# Patient Record
Sex: FEMALE | Race: WHITE | NOT HISPANIC OR LATINO | Employment: UNEMPLOYED | URBAN - METROPOLITAN AREA
[De-identification: names, ages, dates, MRNs, and addresses within clinical notes are randomized per-mention and may not be internally consistent; named-entity substitution may affect disease eponyms.]

---

## 2017-12-14 ENCOUNTER — TRANSFERRED RECORDS (OUTPATIENT)
Dept: HEALTH INFORMATION MANAGEMENT | Facility: CLINIC | Age: 37
End: 2017-12-14

## 2018-04-08 ENCOUNTER — HOSPITAL ENCOUNTER (EMERGENCY)
Facility: CLINIC | Age: 38
Discharge: HOME | End: 2018-04-08
Attending: EMERGENCY MEDICINE | Admitting: EMERGENCY MEDICINE
Payer: COMMERCIAL

## 2018-04-08 ENCOUNTER — APPOINTMENT (OUTPATIENT)
Dept: CT IMAGING | Facility: CLINIC | Age: 38
End: 2018-04-08
Attending: EMERGENCY MEDICINE
Payer: COMMERCIAL

## 2018-04-08 VITALS
HEART RATE: 86 BPM | OXYGEN SATURATION: 95 % | BODY MASS INDEX: 24.11 KG/M2 | SYSTOLIC BLOOD PRESSURE: 118 MMHG | HEIGHT: 66 IN | RESPIRATION RATE: 16 BRPM | TEMPERATURE: 98.1 F | WEIGHT: 150 LBS | DIASTOLIC BLOOD PRESSURE: 76 MMHG

## 2018-04-08 DIAGNOSIS — E86.0 DEHYDRATION: ICD-10-CM

## 2018-04-08 DIAGNOSIS — K12.0 ORAL APHTHOUS ULCER: ICD-10-CM

## 2018-04-08 DIAGNOSIS — N17.9 ACUTE KIDNEY INJURY (H): ICD-10-CM

## 2018-04-08 LAB
ALBUMIN SERPL-MCNC: 2.7 G/DL (ref 3.4–5)
ALP SERPL-CCNC: 109 U/L (ref 40–150)
ALT SERPL W P-5'-P-CCNC: 37 U/L (ref 0–50)
ANION GAP SERPL CALCULATED.3IONS-SCNC: 11 MMOL/L (ref 3–14)
ANION GAP SERPL CALCULATED.3IONS-SCNC: 9 MMOL/L (ref 3–14)
AST SERPL W P-5'-P-CCNC: 112 U/L (ref 0–45)
BASOPHILS # BLD AUTO: 0.1 10E9/L (ref 0–0.2)
BASOPHILS NFR BLD AUTO: 0.6 %
BILIRUB SERPL-MCNC: 0.4 MG/DL (ref 0.2–1.3)
BUN SERPL-MCNC: 21 MG/DL (ref 7–30)
BUN SERPL-MCNC: 23 MG/DL (ref 7–30)
CALCIUM SERPL-MCNC: 7.7 MG/DL (ref 8.5–10.1)
CALCIUM SERPL-MCNC: 8.2 MG/DL (ref 8.5–10.1)
CHLORIDE SERPL-SCNC: 110 MMOL/L (ref 94–109)
CHLORIDE SERPL-SCNC: 116 MMOL/L (ref 94–109)
CO2 SERPL-SCNC: 23 MMOL/L (ref 20–32)
CO2 SERPL-SCNC: 25 MMOL/L (ref 20–32)
CREAT SERPL-MCNC: 1.83 MG/DL (ref 0.52–1.04)
CREAT SERPL-MCNC: 2.16 MG/DL (ref 0.52–1.04)
DIFFERENTIAL METHOD BLD: ABNORMAL
EOSINOPHIL # BLD AUTO: 0.3 10E9/L (ref 0–0.7)
EOSINOPHIL NFR BLD AUTO: 2.4 %
ERYTHROCYTE [DISTWIDTH] IN BLOOD BY AUTOMATED COUNT: 19.8 % (ref 10–15)
GFR SERPL CREATININE-BSD FRML MDRD: 26 ML/MIN/1.7M2
GFR SERPL CREATININE-BSD FRML MDRD: 31 ML/MIN/1.7M2
GLUCOSE SERPL-MCNC: 54 MG/DL (ref 70–99)
GLUCOSE SERPL-MCNC: 57 MG/DL (ref 70–99)
HCT VFR BLD AUTO: 28.5 % (ref 35–47)
HGB BLD-MCNC: 9.1 G/DL (ref 11.7–15.7)
IMM GRANULOCYTES # BLD: 0 10E9/L (ref 0–0.4)
IMM GRANULOCYTES NFR BLD: 0.2 %
LIPASE SERPL-CCNC: 104 U/L (ref 73–393)
LYMPHOCYTES # BLD AUTO: 4.1 10E9/L (ref 0.8–5.3)
LYMPHOCYTES NFR BLD AUTO: 33.9 %
MCH RBC QN AUTO: 25.3 PG (ref 26.5–33)
MCHC RBC AUTO-ENTMCNC: 31.9 G/DL (ref 31.5–36.5)
MCV RBC AUTO: 79 FL (ref 78–100)
MONOCYTES # BLD AUTO: 1.1 10E9/L (ref 0–1.3)
MONOCYTES NFR BLD AUTO: 9 %
NEUTROPHILS # BLD AUTO: 6.5 10E9/L (ref 1.6–8.3)
NEUTROPHILS NFR BLD AUTO: 53.9 %
NRBC # BLD AUTO: 0 10*3/UL
NRBC BLD AUTO-RTO: 0 /100
PLATELET # BLD AUTO: 429 10E9/L (ref 150–450)
POTASSIUM SERPL-SCNC: 3.2 MMOL/L (ref 3.4–5.3)
POTASSIUM SERPL-SCNC: 4 MMOL/L (ref 3.4–5.3)
PROT SERPL-MCNC: 6.7 G/DL (ref 6.8–8.8)
RBC # BLD AUTO: 3.6 10E12/L (ref 3.8–5.2)
SODIUM SERPL-SCNC: 146 MMOL/L (ref 133–144)
SODIUM SERPL-SCNC: 149 MMOL/L (ref 133–144)
WBC # BLD AUTO: 12.1 10E9/L (ref 4–11)

## 2018-04-08 PROCEDURE — 96374 THER/PROPH/DIAG INJ IV PUSH: CPT | Performed by: EMERGENCY MEDICINE

## 2018-04-08 PROCEDURE — 25000132 ZZH RX MED GY IP 250 OP 250 PS 637: Mod: GY | Performed by: EMERGENCY MEDICINE

## 2018-04-08 PROCEDURE — 99284 EMERGENCY DEPT VISIT MOD MDM: CPT | Mod: 25 | Performed by: EMERGENCY MEDICINE

## 2018-04-08 PROCEDURE — 85025 COMPLETE CBC W/AUTO DIFF WBC: CPT | Performed by: EMERGENCY MEDICINE

## 2018-04-08 PROCEDURE — 25000128 H RX IP 250 OP 636: Performed by: EMERGENCY MEDICINE

## 2018-04-08 PROCEDURE — 25800025 ZZH RX 258: Performed by: EMERGENCY MEDICINE

## 2018-04-08 PROCEDURE — 80053 COMPREHEN METABOLIC PANEL: CPT | Performed by: EMERGENCY MEDICINE

## 2018-04-08 PROCEDURE — 83690 ASSAY OF LIPASE: CPT | Performed by: EMERGENCY MEDICINE

## 2018-04-08 PROCEDURE — 40000556 ZZH STATISTIC PERIPHERAL IV START W US GUIDANCE

## 2018-04-08 PROCEDURE — 74176 CT ABD & PELVIS W/O CONTRAST: CPT

## 2018-04-08 PROCEDURE — 99284 EMERGENCY DEPT VISIT MOD MDM: CPT | Mod: Z6 | Performed by: EMERGENCY MEDICINE

## 2018-04-08 PROCEDURE — 96361 HYDRATE IV INFUSION ADD-ON: CPT | Performed by: EMERGENCY MEDICINE

## 2018-04-08 PROCEDURE — 80048 BASIC METABOLIC PNL TOTAL CA: CPT | Performed by: EMERGENCY MEDICINE

## 2018-04-08 PROCEDURE — A9270 NON-COVERED ITEM OR SERVICE: HCPCS | Mod: GY | Performed by: EMERGENCY MEDICINE

## 2018-04-08 RX ORDER — DEXTROSE MONOHYDRATE 25 G/50ML
25 INJECTION, SOLUTION INTRAVENOUS ONCE
Status: COMPLETED | OUTPATIENT
Start: 2018-04-08 | End: 2018-04-08

## 2018-04-08 RX ORDER — SODIUM CHLORIDE 9 MG/ML
1000 INJECTION, SOLUTION INTRAVENOUS CONTINUOUS
Status: DISCONTINUED | OUTPATIENT
Start: 2018-04-08 | End: 2018-04-08 | Stop reason: HOSPADM

## 2018-04-08 RX ORDER — DIPHENOXYLATE HCL/ATROPINE 2.5-.025MG
2 TABLET ORAL 4 TIMES DAILY
COMMUNITY

## 2018-04-08 RX ORDER — DIPHENHYDRAMINE HYDROCHLORIDE AND LIDOCAINE HYDROCHLORIDE AND ALUMINUM HYDROXIDE AND MAGNESIUM HYDRO
15 KIT EVERY 6 HOURS PRN
Status: DISCONTINUED | OUTPATIENT
Start: 2018-04-08 | End: 2018-04-08 | Stop reason: HOSPADM

## 2018-04-08 RX ORDER — POTASSIUM CHLORIDE 20MEQ/15ML
40 LIQUID (ML) ORAL ONCE
Status: COMPLETED | OUTPATIENT
Start: 2018-04-08 | End: 2018-04-08

## 2018-04-08 RX ADMIN — SODIUM CHLORIDE 1000 ML: 9 INJECTION, SOLUTION INTRAVENOUS at 05:37

## 2018-04-08 RX ADMIN — SODIUM CHLORIDE 1000 ML: 9 INJECTION, SOLUTION INTRAVENOUS at 03:30

## 2018-04-08 RX ADMIN — DIPHENHYDRAMINE HYDROCHLORIDE AND LIDOCAINE HYDROCHLORIDE AND ALUMINUM HYDROXIDE AND MAGNESIUM HYDRO 15 ML: KIT at 06:39

## 2018-04-08 RX ADMIN — DEXTROSE MONOHYDRATE 25 ML: 25 INJECTION, SOLUTION INTRAVENOUS at 07:29

## 2018-04-08 RX ADMIN — POTASSIUM CHLORIDE 40 MEQ: 20 SOLUTION ORAL at 04:26

## 2018-04-08 ASSESSMENT — ENCOUNTER SYMPTOMS
ABDOMINAL PAIN: 1
APPETITE CHANGE: 1
FEVER: 0
VOMITING: 1
NAUSEA: 1
ABDOMINAL DISTENTION: 0
RESPIRATORY NEGATIVE: 1
CARDIOVASCULAR NEGATIVE: 1
FATIGUE: 1
CONSTIPATION: 1

## 2018-04-08 NOTE — DISCHARGE INSTRUCTIONS
Follow-up with your doctors at [facility]  Your CT scan is not showing an acute problem  You were mildly dehydrated but this was corrected with IV fluids  Use the Magic mouthwash for the oral ulcers

## 2018-04-08 NOTE — ED AVS SNAPSHOT
[facility], Emergency Department    [address] [address]    Phone:  [phone]                                       Iris Cote   MRN: [id number]    Department:  [facility], Emergency Department   Date of Visit:  4/8/2018           Patient Information     Date Of Birth          1980        Your diagnoses for this visit were:     Acute kidney injury (H)     Dehydration     Oral aphthous ulcer        You were seen by Lon Longoria MD.        Discharge Instructions       Follow-up with your doctors at [facility]  Your CT scan is not showing an acute problem  You were mildly dehydrated but this was corrected with IV fluids  Use the Magic mouthwash for the oral ulcers    24 Hour Appointment Hotline       To make an appointment at any [facility], call [phone] ([phone]). If you don't have a family doctor or clinic, we will help you find one. [facility] clinics are conveniently located to serve the needs of you and your family.             Review of your medicines      Our records show that you are taking the medicines listed below. If these are incorrect, please call your family doctor or clinic.        Dose / Directions Last dose taken    aspirin 81 MG tablet        Take by mouth daily   Refills:  0        CLARITIN PO        Refills:  0        diphenoxylate-atropine 2.5-0.025 MG per tablet   Commonly known as:  LOMOTIL   Dose:  2 tablet        Take 2 tablets by mouth 4 times daily   Refills:  0        doxepin 50 MG capsule   Commonly known as:  SINEquan   Dose:  50 mg        Take 50 mg by mouth At Bedtime   Refills:  0        FIBER-LAX PO        Take by mouth 3 times daily   Refills:  0        hydrOXYzine 50 MG tablet   Commonly known as:  ATARAX   Dose:  50 mg   Quantity:  120 tablet        Take 1 tablet (50 mg) by mouth every 6 hours as needed (pruitus, anxiety)   Refills:  0        ibuprofen 400 MG tablet   Commonly known as:   ADVIL/MOTRIN   Dose:  400 mg   Quantity:  120 tablet        Take 1 tablet (400 mg) by mouth every 6 hours as needed   Refills:  0        LEXAPRO PO   Dose:  20 mg        Take 20 mg by mouth   Refills:  0        METHADONE HCL PO   Dose:  180 mg        Take 180 mg by mouth daily   Refills:  0        MINIPRESS PO        Take by mouth At Bedtime   Refills:  0        NEURONTIN PO   Dose:  1200 mg        Take 1,200 mg by mouth 3 times daily   Refills:  0        OMEPRAZOLE PO   Dose:  20 mg        Take 20 mg by mouth 2 times daily (before meals)   Refills:  0        PROMETHAZINE HCL RE        Refills:  0        * PROVENTIL  (90 BASE) MCG/ACT Inhaler   Dose:  2 puff   Quantity:  1 Inhaler   Generic drug:  albuterol        Inhale 2 puffs into the lungs every 6 hours.   Refills:  0        * albuterol (2.5 MG/3ML) 0.083% neb solution   Dose:  1 ampule   Quantity:  1 Box        Take 3 mLs by nebulization every 6 hours as needed for shortness of breath / dyspnea.   Refills:  0        * Notice:  This list has 2 medication(s) that are the same as other medications prescribed for you. Read the directions carefully, and ask your doctor or other care provider to review them with you.            Procedures and tests performed during your visit     Basic metabolic panel    CBC with platelets differential    CT Abdomen Pelvis w/o Contrast    Comprehensive metabolic panel    Give 20 ounces of water 15 minutes before CT of abdomen    Lipase    V[facility] Care Adult IP Consult      Orders Needing Specimen Collection     Ordered          04/08/18 0218  UA with Microscopic - STAT, Prio: STAT, Needs to be Collected     Scheduled Task Status   04/08/18 0219 Collect UA with Microscopic Open   Order Class:  PCU Collect                04/08/18 0218  Drug abuse screen 6 urine (chem dep) - STAT, Prio: STAT, Needs to be Collected     Scheduled Task Status   04/08/18 0219 Collect Drug abuse screen 6 urine (chem dep) Open   Order Class:   "PCU Collect                  Pending Results     Date and Time Order Name Status Description    2018 0416 CT Abdomen Pelvis w/o Contrast Preliminary             Pending Culture Results     No orders found from 2018 to 2018.            Pending Results Instructions     If you had any lab results that were not finalized at the time of your Discharge, you can call the ED Lab Result RN at [phone]. You will be contacted by this team for any positive Lab results or changes in treatment. The nurses are available 7 days a week from 10A to 6:30P.  You can leave a message 24 hours per day and they will return your call.        Thank you for choosing [facility]       Thank you for choosing [facility] for your care. Our goal is always to provide you with excellent care. Hearing back from our patients is one way we can continue to improve our services. Please take a few minutes to complete the written survey that you may receive in the mail after you visit with us. Thank you!        [employer] Information     [employer] lets you send messages to your doctor, view your test results, renew your prescriptions, schedule appointments and more. To sign up, go to [email or web address] . Click on \"Log in\" on the left side of the screen, which will take you to the Welcome page. Then click on \"Sign up Now\" on the right side of the page.     You will be asked to enter the access code listed below, as well as some personal information. Please follow the directions to create your username and password.     Your access code is: [email or web address]  Expires: 2018  7:38 AM     Your access code will  in 90 days. If you need help or a new code, please call your [facility] clinic or [phone].        Care EveryWhere ID     This is your Care EveryWhere ID. This could be used by other organizations to access your [facility] medical records  [id number]        Equal Access to Services     YASH ROSA: Juan Mullen, " theron zaman, lei carpio. So [facility] [phone].    ATENCIÓN: Si habla español, tiene a potter disposición servicios gratuitos de asistencia lingüística. Llame al [phone].    We comply with applicable federal civil rights laws and [place] laws. We do not discriminate on the basis of race, color, national origin, age, disability, sex, sexual orientation, or gender identity.            After Visit Summary       This is your record. Keep this with you and show to your community pharmacist(s) and doctor(s) at your next visit.

## 2018-04-08 NOTE — ED NOTES
Patient given 4 ounces of apple juice now, and crackers, for chronically low blood sugar; MD notified.

## 2018-04-08 NOTE — ED NOTES
Pt biba with c/o LUQ abdominal pain. Pt states that she has not been able to eat or drink much in the last 3 days due to some sore in her mouth. Also was nauseous and emesis in the last 3 days. No BM x3 days, pretty usual for pt. Also no urine output for 3 days due to decrease fluid intake. Pt also reports syncopal episode prior to calling ambulance. Denies hitting head. Abrasion to left knee. Hx of multiple abdominal surgeries in the couple years. VSS and afebrile.

## 2018-04-08 NOTE — ED PROVIDER NOTES
History     Chief Complaint   Patient presents with     Abdominal Pain     HPI  Iris Cote is a 38 year old female who normally gets her care at [facility].  She has had multiple abdominal surgeries currently has a wound that is healing she also suffers from chronic malnutrition.  She comes in with nausea vomiting and worsening abdominal pain.  She has not distended the chronic abdominal wound is unchanged in appearance.  There is no blood in the emesis.  She has had a cholecystectomy appendectomy and splenectomy as well as subtotal colectomy I believe for Clostridium difficile colitis.  She had a herniorrhaphy with mesh that subsequently was infected and removed.  She has not had fevers.  No urinary symptoms.  She is chronically on methadone for pain.  She occasionally uses heroin and cocaine.    Past Medical History:   Diagnosis Date     Anxiety      Depression      Seizures (H)        Social History     Social History     Marital status: Legally      Spouse name: N/A     Number of children: N/A     Years of education: N/A     Occupational History     Not on file.     Social History Main Topics     Smoking status: Current Every Day Smoker     Packs/day: 1.00     Smokeless tobacco: Not on file     Alcohol use Yes      Comment: rare     Drug use: No      Comment: IV heroin use 3 wks ago, snorts heroin, use of cocaine irreg     Sexual activity: Not on file     Other Topics Concern     Not on file     Social History Narrative         I have reviewed the Medications, Allergies, Past Medical and Surgical History, and Social History in the Epic system.    Review of Systems   Constitutional: Positive for appetite change and fatigue. Negative for fever.   Respiratory: Negative.    Cardiovascular: Negative.    Gastrointestinal: Positive for abdominal pain, constipation, nausea and vomiting. Negative for abdominal distention.   Genitourinary: Negative.    All other systems reviewed and are  "negative.      Physical Exam   BP: 123/75  Pulse: 86  Heart Rate: 75  Temp: 98.1  F (36.7  C)  Resp: 18  Height: 167.6 cm (5' 6\")  Weight: 68 kg (150 lb)  SpO2: 100 %      Physical Exam   Constitutional: She is oriented to person, place, and time. She appears well-developed and well-nourished. No distress.   HENT:   Mouth/Throat: Oral lesions present.   Multiple aphthous ulcers   Eyes: Pupils are equal, round, and reactive to light.   Neck: Neck supple.   Cardiovascular: Normal rate, regular rhythm and normal heart sounds.    Pulmonary/Chest: Effort normal and breath sounds normal.   Abdominal: Soft. She exhibits no distension. There is no tenderness.       Chronic lower abdominal wound examined, appears clean, no purulence no cellulitis, fresh dressing applied   Neurological: She is alert and oriented to person, place, and time.   Skin: Skin is warm. She is not diaphoretic.   Psychiatric: She has a normal mood and affect. Her behavior is normal.   Nursing note and vitals reviewed.      ED Course     ED Course     Procedures        Medications   0.9% sodium chloride BOLUS (0 mLs Intravenous Stopped 4/8/18 0632)     Followed by   sodium chloride 0.9% infusion (not administered)   iohexol (OMNIPAQUE) solution 50 mL (50 mLs Oral Not Given 4/8/18 0437)   magic mouthwash suspension (diphenhydramine, lidocaine, aluminum-magnesium & simethicone) (15 mLs Swish & Spit Given 4/8/18 0639)   0.9% sodium chloride BOLUS (0 mLs Intravenous Stopped 4/8/18 0642)   potassium chloride (KAYCIEL) solution 40 mEq (40 mEq Oral Given 4/8/18 0426)   dextrose 50 % injection 25 mL (25 mLs Intravenous Given 4/8/18 7829)     Orange juice and sandwich for hypoglycemia       Labs Ordered and Resulted from Time of ED Arrival Up to the Time of Departure from the ED   CBC WITH PLATELETS DIFFERENTIAL - Abnormal; Notable for the following:        Result Value    WBC 12.1 (*)     RBC Count 3.60 (*)     Hemoglobin 9.1 (*)     Hematocrit 28.5 (*)     MCH " 25.3 (*)     RDW 19.8 (*)     All other components within normal limits   COMPREHENSIVE METABOLIC PANEL - Abnormal; Notable for the following:     Sodium 146 (*)     Potassium 3.2 (*)     Chloride 110 (*)     Glucose 57 (*)     Creatinine 2.16 (*)     GFR Estimate 26 (*)     GFR Estimate If Black 31 (*)     Calcium 8.2 (*)     Albumin 2.7 (*)     Protein Total 6.7 (*)      (*)     All other components within normal limits   BASIC METABOLIC PANEL - Abnormal; Notable for the following:     Sodium 149 (*)     Chloride 116 (*)     Glucose 54 (*)     Creatinine 1.83 (*)     GFR Estimate 31 (*)     GFR Estimate If Black 38 (*)     Calcium 7.7 (*)     All other components within normal limits   LIPASE   ROUTINE UA WITH MICROSCOPIC   DRUG ABUSE SCREEN 6 CHEM DEP URINE ([facility])   FREE WATER            Assessments & Plan (with Medical Decision Making)   38-year-old female normally gets her care at [facility] comes in with for the last few days she has a chronic healing abdominal wound after multiple abdominal surgeries at [facility].  Here she was mildly dehydrated her baseline creatinine is 1.8 she was also mildly hypokalemic these were both corrected with oral potassium and IV fluids.  We did a CT of her abdomen that shows some mildly borderline loops of bowel she may have enteritis but no definitive bowel obstruction.  She did not have vomiting here no fevers.  She will be discharged home and encouraged to follow-up at [facility]    I have reviewed the nursing notes.    I have reviewed the findings, diagnosis, plan and need for follow up with the patient.    New Prescriptions    No medications on file       Final diagnoses:   Acute kidney injury (H)   Dehydration   Oral aphthous ulcer       4/8/2018   [facility], [place], EMERGENCY DEPARTMENT     Lon Longoria MD  04/08/18 0725       Lon Longoria MD  04/08/18 0731

## 2018-04-08 NOTE — ED AVS SNAPSHOT
[facility], [place], Emergency Department    [address] [place] [address]    Phone:  [phone]                                       Iris Cote   MRN: [id number]    Department:  [facility], Emergency Department   Date of Visit:  4/8/2018           After Visit Summary Signature Page     I have received my discharge instructions, and my questions have been answered. I have discussed any challenges I see with this plan with the nurse or doctor.    ..........................................................................................................................................  Patient/Patient Representative Signature      ..........................................................................................................................................  Patient Representative Print Name and Relationship to Patient    ..................................................               ................................................  Date                                            Time    ..........................................................................................................................................  Reviewed by Signature/Title    ...................................................              ..............................................  Date                                                            Time

## 2020-08-24 ENCOUNTER — TRANSFERRED RECORDS (OUTPATIENT)
Dept: HEALTH INFORMATION MANAGEMENT | Facility: CLINIC | Age: 40
End: 2020-08-24

## 2020-08-24 LAB — EJECTION FRACTION: 58 %

## 2020-09-25 ENCOUNTER — TRANSFERRED RECORDS (OUTPATIENT)
Dept: HEALTH INFORMATION MANAGEMENT | Facility: CLINIC | Age: 40
End: 2020-09-25

## 2020-09-27 LAB — EJECTION FRACTION: 70 %

## 2020-10-08 ENCOUNTER — TRANSFERRED RECORDS (OUTPATIENT)
Dept: HEALTH INFORMATION MANAGEMENT | Facility: CLINIC | Age: 40
End: 2020-10-08

## 2020-12-15 ENCOUNTER — TRANSFERRED RECORDS (OUTPATIENT)
Dept: HEALTH INFORMATION MANAGEMENT | Facility: CLINIC | Age: 40
End: 2020-12-15

## 2020-12-16 ENCOUNTER — TRANSFERRED RECORDS (OUTPATIENT)
Dept: HEALTH INFORMATION MANAGEMENT | Facility: CLINIC | Age: 40
End: 2020-12-16

## 2020-12-17 ENCOUNTER — TRANSFERRED RECORDS (OUTPATIENT)
Dept: HEALTH INFORMATION MANAGEMENT | Facility: CLINIC | Age: 40
End: 2020-12-17

## 2021-03-05 ENCOUNTER — TRANSFERRED RECORDS (OUTPATIENT)
Dept: HEALTH INFORMATION MANAGEMENT | Facility: CLINIC | Age: 41
End: 2021-03-05

## 2021-03-05 ENCOUNTER — MEDICAL CORRESPONDENCE (OUTPATIENT)
Dept: HEALTH INFORMATION MANAGEMENT | Facility: CLINIC | Age: 41
End: 2021-03-05

## 2021-03-08 ENCOUNTER — TRANSCRIBE ORDERS (OUTPATIENT)
Dept: OTHER | Age: 41
End: 2021-03-08

## 2021-03-08 DIAGNOSIS — B19.20 DECOMPENSATED HCV CIRRHOSIS (H): Primary | ICD-10-CM

## 2021-03-08 DIAGNOSIS — K74.69 DECOMPENSATED HCV CIRRHOSIS (H): Primary | ICD-10-CM

## 2021-03-10 ENCOUNTER — REFERRAL (OUTPATIENT)
Dept: TRANSPLANT | Facility: CLINIC | Age: 41
End: 2021-03-10

## 2021-03-10 DIAGNOSIS — B19.20 HCV (HEPATITIS C VIRUS): Primary | ICD-10-CM

## 2021-03-10 NOTE — LETTER
3/18/2021    Iris Cote  [address]  [address]  [place] [address]      Dear Iris,    Thank you for your interest in the Transplant Center at [facility]. We look forward to being a part of your care team and assisting you through the transplant process.    As we discussed, your transplant coordinator is Charlene Coreas (Liver).  You may call your coordinator at any time with questions or concerns call [phone].    Please complete the following.    1. Fill out and return the enclosed forms    Authorization for Electronic Communication    Authorization to Discuss Protected Health Information    Authorization for Release of Protected Health Information    Authorization for Care Everywhere Release of Information    2. Sign up for:    [employer], access to your electronic medical record (see enclosed pamphlet)    [email or web address], a transplant education website    You can use these tools to learn more about your transplant, communicate with your care team, and track your medical details      Sincerely,  Solid Organ Transplant  [facility]    cc: Referring Physician and PCP

## 2021-03-15 ENCOUNTER — VIRTUAL VISIT (OUTPATIENT)
Dept: GASTROENTEROLOGY | Facility: CLINIC | Age: 41
End: 2021-03-15
Attending: INTERNAL MEDICINE
Payer: COMMERCIAL

## 2021-03-15 DIAGNOSIS — K74.69 OTHER CIRRHOSIS OF LIVER (H): Primary | ICD-10-CM

## 2021-03-15 DIAGNOSIS — N18.30 STAGE 3 CHRONIC KIDNEY DISEASE, UNSPECIFIED WHETHER STAGE 3A OR 3B CKD (H): ICD-10-CM

## 2021-03-15 DIAGNOSIS — Z12.9 SCREENING FOR CANCER: ICD-10-CM

## 2021-03-15 DIAGNOSIS — K74.69 DECOMPENSATED HCV CIRRHOSIS (H): ICD-10-CM

## 2021-03-15 DIAGNOSIS — I85.00 ESOPHAGEAL VARICES WITHOUT BLEEDING, UNSPECIFIED ESOPHAGEAL VARICES TYPE (H): Primary | ICD-10-CM

## 2021-03-15 DIAGNOSIS — A04.71 RECURRENT COLITIS DUE TO CLOSTRIDIOIDES DIFFICILE: ICD-10-CM

## 2021-03-15 DIAGNOSIS — R18.8 OTHER ASCITES: ICD-10-CM

## 2021-03-15 DIAGNOSIS — B19.20 DECOMPENSATED HCV CIRRHOSIS (H): ICD-10-CM

## 2021-03-15 DIAGNOSIS — Z01.818 ENCOUNTER FOR PRE-TRANSPLANT EVALUATION FOR CHRONIC LIVER DISEASE: ICD-10-CM

## 2021-03-15 PROCEDURE — 99204 OFFICE O/P NEW MOD 45 MIN: CPT | Mod: GT | Performed by: INTERNAL MEDICINE

## 2021-03-15 RX ORDER — CLONIDINE HYDROCHLORIDE 0.1 MG/1
0.1 TABLET ORAL 2 TIMES DAILY
COMMUNITY
Start: 2021-03-11

## 2021-03-15 RX ORDER — BUPRENORPHINE HYDROCHLORIDE AND NALOXONE HYDROCHLORIDE DIHYDRATE 8; 2 MG/1; MG/1
1 TABLET SUBLINGUAL
COMMUNITY

## 2021-03-15 RX ORDER — CARVEDILOL 12.5 MG/1
12.5 TABLET ORAL
COMMUNITY
Start: 2020-10-14

## 2021-03-15 ASSESSMENT — PAIN SCALES - GENERAL: PAINLEVEL: NO PAIN (0)

## 2021-03-15 NOTE — PROGRESS NOTES
Iris is a 40 year old who is being evaluated via a billable video visit.      How would you like to obtain your AVS? MyChart  If the video visit is dropped, the invitation should be resent by: Send to e-mail at: [email or web address]  Will anyone else be joining your video visit? No    Video Start Time: 0803  Video-Visit Details    Type of service:  Video Visit    Video End Time:0830    Originating Location (pt. 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    Diagnosis chronic hepatitis C in 2014.  Underwent treatment in 2017 with 12 weeks of Harvoni and obtained SVR.  Labs checked last in October 2020 with negative hep C viral level.  She had decompensated disease prior to her treatment with ascites, and has had less than a half dozen paracentesis in her lifetime.  Her last paracentesis was in December during her hospitalization, and she was started on Lasix and spironolactone at that time and has not had issues with further volume overload.  She has not had issues with impaired memory or concentration.  An EGD performed in December 2020 did not demonstrate any evidence of esophageal varices.  Abdominal imaging was performed in December 2020 and  did not demonstrate any evidence of hepatocellular carcinoma.    She had recurrent issues with C. difficile in the past and unfortunately developed toxic megacolon requiring an almost total colectomy in 2008.  She had a colostomy for approximately 1 year and then had a takedown.  She is also had abdominal surgeries including: An appendectomy in 2004, cholecystectomy, and a splenectomy for ITP back in 2002.  She had significant mesh use for abdominal hernias and this was revised in 2015/2016 -unfortunately she has had issues with a chronic nonhealing abdominal wound since this time.  She packs the wound and has a topical dressing that she uses and changes frequently.  She was followed by the wound clinic at [facility] for the several past years.    She notes she does carry a history of COPD and does have a historical diagnosis of heart failure.  The diagnosis of heart failure was given to her when she was living in [place], and notes at that time that she had developed intermittent V. tach which was felt related to her chronic methadone use.  Methadone was transitioned to Suboxone and she was started on carvedilol and she does not believe she has any issues with heart failure at this time.    Past Medical History:  Past Medical History:   Diagnosis Date     Acute idiopathic thrombocytopenic purpura (H)     s/p splenectomy 2002     Anxiety      Anxiety and depression      Ascites      CKD (chronic kidney disease) stage 3, GFR 30-59 ml/min      COPD (chronic obstructive pulmonary disease) (H)      Depression      Hepatic cirrhosis due to chronic hepatitis C infection (H)      History of cocaine abuse (H)      History of heart failure      History of hepatitis C Dx 2014    SVR after 12 weeks Alondra 2017     Nicotine dependence      Opioid dependence on maintenance agonist therapy, no symptoms (H)     Was on methadone (developed V Tach), now on suboxone     Recurrent colitis due to Clostridioides difficile     complicated  by megacolon s/p colectomy      Seizure disorder (H)      Seizures (H)        Surgical History:  Past Surgical History:   Procedure Laterality Date     APPENDECTOMY  2004     CHOLECYSTECTOMY  2013     COLOSTOMY      colon removal, colostomnyin [id number], reversed in      GI SURGERY  2002    splenectomy     HC EXCISION INFECTED MESH, ABDOMEN  2015     SPLENECTOMY  2002       Social History:  Social History     Tobacco Use     Smoking status: Former Smoker     Packs/day: 1.00     Quit date: 2020     Years since quittin.5     Smokeless tobacco: Never Used   Substance Use Topics     Alcohol use: Yes     Comment: rare     Drug use: No     Comment: History of heroin (IV) and cocaine (smoked); sober since 2019       Family History:  Family History   Problem Relation Age of Onset     Lung Cancer Maternal Grandfather      Liver Disease No family hx of        Medications:  Current Outpatient Medications   Medication     buprenorphine-naloxone (SUBOXONE) 8-2 MG SUBL sublingual tablet     carvedilol (COREG) 12.5 MG tablet     cloNIDine (CATAPRES) 0.1 MG tablet     diphenoxylate-atropine (LOMOTIL) 2.5-0.025 MG per tablet     Gabapentin (NEURONTIN PO)     sertraline (ZOLOFT) 50 MG tablet     albuterol (2.5 MG/3ML) 0.083% nebulizer solution     Albuterol Sulfate (PROVENTIL HFA) 108 (90 BASE) MCG/ACT AERS     aspirin 81 MG tablet     Calcium Polycarbophil (FIBER-LAX PO)     DoxePIN (SINEQUAN) 50 MG capsule     Escitalopram Oxalate (LEXAPRO PO)     hydrOXYzine (ATARAX) 50 MG tablet     ibuprofen (ADVIL,MOTRIN) 400 MG tablet     Loratadine (CLARITIN PO)     METHADONE HCL PO     OMEPRAZOLE PO     Prazosin HCl (MINIPRESS PO)     PROMETHAZINE HCL RE     No current facility-administered medications for this visit.        Review of Systems  A complete 10 point review of systems was asked and answered in the negative unless specifically commented upon in the HPI    Objective:         There were no vitals filed for this  visit.  There is no height or weight on file to calculate BMI.     Physical Exam  Constitutional: Well-developed, well-nourished, in no apparent distress.    HEENT: Normocephalic. no scleral icterus.   Neck/Lymph: Normal ROM  Respiratory:Normal respiratory excursion   GI:  Several surgical incisions on skin, large adhesive dressing over mid abdomen.     Skin:  No jaundice.   Peripheral Vascular: no lower extremity edema.   Musculoskeletal:  ROM intact, normal muscle bulk    Psychiatric: Normal mood and affect. Behavior is normal.  Neuro:  no asterixis, no tremor    Labs and Diagnostic tests:  Labs from March 5, 2021: Sodium 136, bicarb 28, creatinine 1.32, AST 42, ALT 13, total bilirubin 0.2, alkaline phosphatase 198, WBC 11.4, hemoglobin 8.5, MCV 78.5, platelets 767, INR 1.1, albumin 2.8, alpha-fetoprotein 3    Imaging:  Abd US 12/2021  Findings:    Liver: There is free fluid surrounding the liver. Nodular contour of the liver. No visualized hepatic lesion. The main portal vein is patent with antegrade flow.    Gallbladder:  Surgically absent. The common duct measures 1 cm, within normal limits status post cholecystectomy.    Pancreas: Unremarkable     Right kidney: Measures 11 x 4.2 x 4.8 cm. No hydronephrosis.  Left kidney: Measures 8.2 x 3.8 x 4.3 cm.  No hydronephrosis. 2.8 x 0.5 x 0.8 cm hypoechoic lesion arising from the kidney, may represent simple cyst with fluid, hemorrhagic, or proteinaceous contents.    Spleen: Surgically absent. Free fluid in the left upper quadrant.    Normal caliber aorta. Patent IVC      Procedures:  EGD: 12/2020  Impression:          - Moderate Schatzki ring. Patient with narrowed esophagus  suggestive of EoE biopsies not performed at this time, she  has no dysphagia so finding of unclear clinical  signifance. No varices seen or sites of potential bleeding  to explain her chronic anemia.  - Erythematous mucosa in the gastric fundus, gastric body  and antrum.  - Normal examined  duodenum.    Colonoscopy: 2020  Impression:          - The procedure was aborted.  - Stool in the rectum.  - No specimens collected.    Assessment and Plan:    Cirrhosis:    -Secondary to chronic hepatitis C infection  -Thorough lab evaluation has been completed and only other noted abnormality is a positive serum JACK with titer 1:320.  That said, I do not believe the pattern of her liver tests suggest she has autoimmune hepatitis  -She has decompensated disease with ascites, that now appears compensated on low-dose diuretics  -She is up-to-date on her screening procedures  -We will repeat M ELD labs in 6 months    Chronic hepatitis C  -Treated with SVR in 2017; HCV RNA - 2020    Liver Transplant Candidacy:   - We discussed need for transplantation, organ availability and prioritization process for liver transplantation in the [place].  We discussed the guiding principals of justice and equality that dictate transplant candidacy  - We discussed the MELD score, the variables that are followed, and how the MELD score impacts transplantation  - We discussed the evaluation process for candidacy  - We discussed donor types - including  donors (DBD, DCD) and living donors  -At this time I have significant concerns about her ability to be a safe transplant candidate.  My greatest concerns regarding her history of medical problems including but not limited to: COPD, history of heart failure, history of V. tach.  Also have significant concerns about history of intra-abdominal surgeries and this may create an environment that is not amenable to surgical intervention    -We will have the patient seen in consultation by our transplant surgeons prior to completing a full transplant evaluation    Hepatocellular Cancer Screening:   -Abdominal ultrasound performed 2020 without evidence of hepatic masses  - Recommend screening for HCC every 6 months with either abdominal ultrasound or by  alternating abdominal ultrasound with EITHER a triple/quad phase CT Liver with IV contrast OR a Quad phase MRI Liver with IV contrast.  AFP levels should be checked every 6 months at time of imaging screen.    Ascites:  -She does have a history of ascites that is required rare paracentesis, with last December 2020  -Is currently on a regimen of Lasix 20 mg daily and spironolactone 25 mg daily with excellent result  - Continue to follow a sodium restricted (<2g sodium diet)     Hepatic Encephalopathy:  -No acute issues at this time.  Educated as to the symptoms    Esophageal Varices:   -EGD performed in December 2020 without evidence of esophageal varices  -Noted that she is on carvedilol for unrelated reasons    Kidney Health:   -She does have chronic kidney disease stage III for unclear reasons    History of colectomy:  -Felt prudent to repeat colonoscopy.  Recommend a 2-day preparation with Gatorade/MiraLAX prep  -We will plan to order with next clinic visit if not performed locally    Nutrition:  As with most patients with chronic liver disease, there is a significant degree of protein malnutrition.  Dicussed need to change dietary habits to improve overall protein balance.  Discussed the importance of eating between 1.2-1.5g/kg/day lean protein like eggs, fish, chicken, nuts, and legumes, in addition to a diet rich in fresh fruits and vegetables.  Continue to follow a sodium restricted (<2g sodium diet) and discussed the need to minimize the intake of carbohydrates and sugars, to avoid obesity.   - Strongly encourage protein supplements 2-3 times daily (Boost, Ensure, [place] Instant Milk, etc.) to meet protein and caloric intake.  - Recommend a bedtime snack with protein and complex carbohydrate to minimize risk of muscle catabolism overnight    Routine Health Care in Patient with Chronic Liver Disease:  - We recommend screening for hepatitis A and B, please vaccinate if not immune  - All patients with liver  disease, particularly those with cholestatic liver disease, are at an increased risk for osteoporosis.  We strongly recommend screening for Vitamin D deficiency at least twice yearly with aggressive supplementation/replacement as indicated.    - We also recommend a screening DEXA scan to evaluate for osteoporosis.  If present, should treat with calcium, Vitamin D supplementation, and recommend consideration of bisphosphonate therapy.  Also recommend follow up DEXA scans to evaluate for improvement of bone density on therapy.  - All patients with liver disease should avoid the use of Non-steroidal Anti-Inflammatory (NSAID) medications as they can cause significant injury to the kidneys in this population    Follow Up:  3 months     Thank you very much for the opportunity to participate in the care of this patient.  If you have any further questions, please don't hesitate to contact our office.    Thomas M. Leventhal, M.D.   of Medicine  Advanced & Transplant Hepatology  [facility]     Approximately 45 minutes of non face-to-face time were spent in review of the patient's medical record to date.  This included review of previous: clinic visits, hospital records, lab results, imaging studies, and procedural documentation.  The findings from this review are summarized in the above note.

## 2021-03-15 NOTE — LETTER
3/15/2021         RE: Iris Cote  [address]  [facility] [address]        Dear Colleague,    Thank you for referring your patient, Iris Cote, to the [facility] [place]. Please see a copy of my visit note below.    Iris is a 40 year old who is being evaluated via a billable video visit.      How would you like to obtain your AVS? MyChart  If the video visit is dropped, the invitation should be resent by: Send to e-mail at: [email or web address]  Will anyone else be joining your video visit? No    Video Start Time: 0803  Video-Visit Details    Type of service:  Video Visit    Video End Time:0830    Originating Location (pt. 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    Diagnosis chronic hepatitis C in 2014.  Underwent treatment in 2017 with 12 weeks of Harvoni and obtained SVR.  Labs checked last in October 2020 with negative hep C viral level.  She had decompensated disease prior to her treatment with ascites, and has had less than a half dozen paracentesis in her lifetime.  Her last paracentesis was in December during her hospitalization, and she was started on Lasix and  spironolactone at that time and has not had issues with further volume overload.  She has not had issues with impaired memory or concentration.  An EGD performed in December 2020 did not demonstrate any evidence of esophageal varices.  Abdominal imaging was performed in December 2020 and did not demonstrate any evidence of hepatocellular carcinoma.    She had recurrent issues with C. difficile in the past and unfortunately developed toxic megacolon requiring an almost total colectomy in 2008.  She had a colostomy for approximately 1 year and then had a takedown.  She is also had abdominal surgeries including: An appendectomy in 2004, cholecystectomy, and a splenectomy for ITP back in 2002.  She had significant mesh use for abdominal hernias and this was revised in 2015/2016 -unfortunately she has had issues with a chronic nonhealing abdominal wound since this time.  She packs the wound and has a topical dressing that she uses and changes frequently.  She was followed by the wound clinic at [facility] for the several past years.    She notes she does carry a history of COPD and does have a historical diagnosis of heart failure.  The diagnosis of heart failure was given to her when she was living in [place], and notes at that time that she had developed intermittent V. tach which was felt related to her chronic methadone use.  Methadone was transitioned to Suboxone and she was started on carvedilol and she does not believe she has any issues with heart failure at this time.    Past Medical History:  Past Medical History:   Diagnosis Date     Acute idiopathic thrombocytopenic purpura (H)     s/p splenectomy 2002     Anxiety      Anxiety and depression      Ascites      CKD (chronic kidney disease) stage 3, GFR 30-59 ml/min      COPD (chronic obstructive pulmonary disease) (H)      Depression      Hepatic cirrhosis due to chronic hepatitis C infection (H)      History of cocaine abuse (H)      History of heart failure       History of hepatitis C Dx 2014    SVR after 12 weeks Alondra 2017     Nicotine dependence      Opioid dependence on maintenance agonist therapy, no symptoms (H)     Was on methadone (developed V Tach), now on suboxone     Recurrent colitis due to Clostridioides difficile     complicated by megacolon s/p colectomy      Seizure disorder (H)      Seizures (H)        Surgical History:  Past Surgical History:   Procedure Laterality Date     APPENDECTOMY  2004     CHOLECYSTECTOMY  2013     COLOSTOMY      colon removal, colostomnyin [id number], reversed in      GI SURGERY      splenectomy     HC EXCISION INFECTED MESH, ABDOMEN  2015     SPLENECTOMY  2002       Social History:  Social History     Tobacco Use     Smoking status: Former Smoker     Packs/day: 1.00     Quit date: 2020     Years since quittin.5     Smokeless tobacco: Never Used   Substance Use Topics     Alcohol use: Yes     Comment: rare     Drug use: No     Comment: History of heroin (IV) and cocaine (smoked); sober since        Family History:  Family History   Problem Relation Age of Onset     Lung Cancer Maternal Grandfather      Liver Disease No family hx of        Medications:  Current Outpatient Medications   Medication     buprenorphine-naloxone (SUBOXONE) 8-2 MG SUBL sublingual tablet     carvedilol (COREG) 12.5 MG tablet     cloNIDine (CATAPRES) 0.1 MG tablet     diphenoxylate-atropine (LOMOTIL) 2.5-0.025 MG per tablet     Gabapentin (NEURONTIN PO)     sertraline (ZOLOFT) 50 MG tablet     albuterol (2.5 MG/3ML) 0.083% nebulizer solution     Albuterol Sulfate (PROVENTIL HFA) 108 (90 BASE) MCG/ACT AERS     aspirin 81 MG tablet     Calcium Polycarbophil (FIBER-LAX PO)     DoxePIN (SINEQUAN) 50 MG capsule     Escitalopram Oxalate (LEXAPRO PO)     hydrOXYzine (ATARAX) 50 MG tablet     ibuprofen (ADVIL,MOTRIN) 400 MG tablet     Loratadine (CLARITIN PO)     METHADONE HCL PO     OMEPRAZOLE PO     Prazosin HCl (MINIPRESS PO)      PROMETHAZINE HCL RE     No current facility-administered medications for this visit.        Review of Systems  A complete 10 point review of systems was asked and answered in the negative unless specifically commented upon in the HPI    Objective:         There were no vitals filed for this visit.  There is no height or weight on file to calculate BMI.     Physical Exam  Constitutional: Well-developed, well-nourished, in no apparent distress.    HEENT: Normocephalic. no scleral icterus.   Neck/Lymph: Normal ROM  Respiratory:Normal respiratory excursion   GI:  Several surgical incisions on skin, large adhesive dressing over mid abdomen.     Skin:  No jaundice.   Peripheral Vascular: no lower extremity edema.   Musculoskeletal:  ROM intact, normal muscle bulk    Psychiatric: Normal mood and affect. Behavior is normal.  Neuro:  no asterixis, no tremor    Labs and Diagnostic tests:  Labs from March 5, 2021: Sodium 136, bicarb 28, creatinine 1.32, AST 42, ALT 13, total bilirubin 0.2, alkaline phosphatase 198, WBC 11.4, hemoglobin 8.5, MCV 78.5, platelets 767, INR 1.1, albumin 2.8, alpha-fetoprotein 3    Imaging:  Abd US 12/2021  Findings:    Liver: There is free fluid surrounding the liver. Nodular contour of the liver. No visualized hepatic lesion. The main portal vein is patent with antegrade flow.    Gallbladder:  Surgically absent. The common duct measures 1 cm, within normal limits status post cholecystectomy.    Pancreas: Unremarkable     Right kidney: Measures 11 x 4.2 x 4.8 cm. No hydronephrosis.  Left kidney: Measures 8.2 x 3.8 x 4.3 cm.  No hydronephrosis. 2.8 x 0.5 x 0.8 cm hypoechoic lesion arising from the kidney, may represent simple cyst with fluid, hemorrhagic, or proteinaceous contents.    Spleen: Surgically absent. Free fluid in the left upper quadrant.    Normal caliber aorta. Patent IVC      Procedures:  EGD: 12/2020  Impression:          - Moderate Schatzki ring. Patient with narrowed  esophagus  suggestive of EoE biopsies not performed at this time, she  has no dysphagia so finding of unclear clinical  signifance. No varices seen or sites of potential bleeding  to explain her chronic anemia.  - Erythematous mucosa in the gastric fundus, gastric body  and antrum.  - Normal examined duodenum.    Colonoscopy: 2020  Impression:          - The procedure was aborted.  - Stool in the rectum.  - No specimens collected.    Assessment and Plan:    Cirrhosis:    -Secondary to chronic hepatitis C infection  -Thorough lab evaluation has been completed and only other noted abnormality is a positive serum JACK with titer 1:320.  That said, I do not believe the pattern of her liver tests suggest she has autoimmune hepatitis  -She has decompensated disease with ascites, that now appears compensated on low-dose diuretics  -She is up-to-date on her screening procedures  -We will repeat M ELD labs in 6 months    Chronic hepatitis C  -Treated with SVR in 2017; HCV RNA - 2020    Liver Transplant Candidacy:   - We discussed need for transplantation, organ availability and prioritization process for liver transplantation in the [place].  We discussed the guiding principals of justice and equality that dictate transplant candidacy  - We discussed the MELD score, the variables that are followed, and how the MELD score impacts transplantation  - We discussed the evaluation process for candidacy  - We discussed donor types - including  donors (DBD, DCD) and living donors  -At this time I have significant concerns about her ability to be a safe transplant candidate.  My greatest concerns regarding her history of medical problems including but not limited to: COPD, history of heart failure, history of V. tach.  Also have significant concerns about history of intra-abdominal surgeries and this may create an environment that is not amenable to surgical intervention    -We will have the patient seen in  consultation by our transplant surgeons prior to completing a full transplant evaluation    Hepatocellular Cancer Screening:   -Abdominal ultrasound performed December 2020 without evidence of hepatic masses  - Recommend screening for HCC every 6 months with either abdominal ultrasound or by alternating abdominal ultrasound with EITHER a triple/quad phase CT Liver with IV contrast OR a Quad phase MRI Liver with IV contrast.  AFP levels should be checked every 6 months at time of imaging screen.    Ascites:  -She does have a history of ascites that is required rare paracentesis, with last December 2020  -Is currently on a regimen of Lasix 20 mg daily and spironolactone 25 mg daily with excellent result  - Continue to follow a sodium restricted (<2g sodium diet)     Hepatic Encephalopathy:  -No acute issues at this time.  Educated as to the symptoms    Esophageal Varices:   -EGD performed in December 2020 without evidence of esophageal varices  -Noted that she is on carvedilol for unrelated reasons    Kidney Health:   -She does have chronic kidney disease stage III for unclear reasons    History of colectomy:  -Felt prudent to repeat colonoscopy.  Recommend a 2-day preparation with Gatorade/MiraLAX prep  -We will plan to order with next clinic visit if not performed locally    Nutrition:  As with most patients with chronic liver disease, there is a significant degree of protein malnutrition.  Dicussed need to change dietary habits to improve overall protein balance.  Discussed the importance of eating between 1.2-1.5g/kg/day lean protein like eggs, fish, chicken, nuts, and legumes, in addition to a diet rich in fresh fruits and vegetables.  Continue to follow a sodium restricted (<2g sodium diet) and discussed the need to minimize the intake of carbohydrates and sugars, to avoid obesity.   - Strongly encourage protein supplements 2-3 times daily (Boost, Ensure, [place] Instant Milk, etc.) to meet protein and caloric  intake.  - Recommend a bedtime snack with protein and complex carbohydrate to minimize risk of muscle catabolism overnight    Routine Health Care in Patient with Chronic Liver Disease:  - We recommend screening for hepatitis A and B, please vaccinate if not immune  - All patients with liver disease, particularly those with cholestatic liver disease, are at an increased risk for osteoporosis.  We strongly recommend screening for Vitamin D deficiency at least twice yearly with aggressive supplementation/replacement as indicated.    - We also recommend a screening DEXA scan to evaluate for osteoporosis.  If present, should treat with calcium, Vitamin D supplementation, and recommend consideration of bisphosphonate therapy.  Also recommend follow up DEXA scans to evaluate for improvement of bone density on therapy.  - All patients with liver disease should avoid the use of Non-steroidal Anti-Inflammatory (NSAID) medications as they can cause significant injury to the kidneys in this population    Follow Up:  3 months     Thank you very much for the opportunity to participate in the care of this patient.  If you have any further questions, please don't hesitate to contact our office.    Thomas M. Leventhal, M.D.   of Medicine  Advanced & Transplant Hepatology  [facility]     Approximately 45 minutes of non face-to-face time were spent in review of the patient's medical record to date.  This included review of previous: clinic visits, hospital records, lab results, imaging studies, and procedural documentation.  The findings from this review are summarized in the above note.        Again, thank you for allowing me to participate in the care of your patient.        Sincerely,        Thomas M. Leventhal, MD

## 2021-03-18 ENCOUNTER — TELEPHONE (OUTPATIENT)
Dept: GASTROENTEROLOGY | Facility: CLINIC | Age: 41
End: 2021-03-18

## 2021-03-18 ENCOUNTER — DOCUMENTATION ONLY (OUTPATIENT)
Dept: TRANSPLANT | Facility: CLINIC | Age: 41
End: 2021-03-18

## 2021-03-18 VITALS — HEIGHT: 66 IN | WEIGHT: 168 LBS | BODY MASS INDEX: 27 KG/M2

## 2021-03-18 SDOH — HEALTH STABILITY: MENTAL HEALTH: HOW MANY STANDARD DRINKS CONTAINING ALCOHOL DO YOU HAVE ON A TYPICAL DAY?: NOT ASKED

## 2021-03-18 SDOH — HEALTH STABILITY: MENTAL HEALTH: HOW OFTEN DO YOU HAVE A DRINK CONTAINING ALCOHOL?: NOT ASKED

## 2021-03-18 SDOH — HEALTH STABILITY: MENTAL HEALTH: HOW OFTEN DO YOU HAVE 6 OR MORE DRINKS ON ONE OCCASION?: NOT ASKED

## 2021-03-18 ASSESSMENT — MIFFLIN-ST. JEOR: SCORE: 1458.79

## 2021-03-18 NOTE — TELEPHONE ENCOUNTER
Patient was asked the following questions during liver intake call.     Referring Provider: Jeferson GORE  Referring Diagnosis: HCV/Cirrhosis of the Liver   PCP: Dr. Chantel Young     1)Do you know why you have liver disease: Yes             If Alcoholic Cirrhosis is present when was your last drink: 2019             Have you ever been through treatment for alcohol:   2) Presence of Ascites: Yes Paracentesis: Yes  3) Presence of Hepatic Encephalopathy: No Medications: Yes  4) History of GI Bleeding: None  5) Oxygen Use: None  6) EGD: Yes Where: [facility] When: 2020  7) Colonoscopy: Yes Where: [facility] When:2020  8) MELD Score: 10  9) Insurance information: [employer]       Policy carver: Self       Subscriber/policy/ID number: [id number]      Group Number: [id number]    Referral intake process completed.  Patient is aware that after financial approval is received, medical records will be requested.   Patient confirmed for a callback from transplant coordinator on 3/22/21.  Tentative evaluation date TBD.    Confirmed coordinator will discuss evaluation process in more detail at the time of their call.   Patient is aware of the need to arrange age appropriate cancer screening, vaccinations, and dental care.  Reminded patient to complete questionnaire, complete medical records release, and review packet prior to evaluation visit .  Assessed patient for special needs (ie--wheelchair, assistance, guardian, and ):  None  Patient instructed to call [phone] with questions.     Patient gave verbal consent during intake call to obtain medical records and documents outside of [facility]:  Yes    MOHIT Chen, LPN   Solid Organ Transplant

## 2021-03-22 NOTE — TELEPHONE ENCOUNTER
Patient scheduled to receive a call from me this morning to begin txp referral process    Unable to reach- left a voice mail for patient to call my direct line [phone].

## 2021-03-25 NOTE — PROGRESS NOTES
Left second voice mail trying to reach patient to initiate referral call for txp eval;  scheduling has also tried to reach in order to set up txp surgery consult appt.

## 2021-04-07 NOTE — TELEPHONE ENCOUNTER
Spoke with the patient and confirmed Transplant Surgeon appointments on 4/22/21.  Informed patient an itinerary can be accessed on [employer].

## 2021-04-17 ENCOUNTER — HEALTH MAINTENANCE LETTER (OUTPATIENT)
Age: 41
End: 2021-04-17

## 2021-04-22 ENCOUNTER — DOCUMENTATION ONLY (OUTPATIENT)
Dept: TRANSPLANT | Facility: CLINIC | Age: 41
End: 2021-04-22

## 2021-04-22 ENCOUNTER — OFFICE VISIT (OUTPATIENT)
Dept: TRANSPLANT | Facility: CLINIC | Age: 41
End: 2021-04-22
Attending: INTERNAL MEDICINE
Payer: COMMERCIAL

## 2021-04-22 VITALS
BODY MASS INDEX: 26.26 KG/M2 | DIASTOLIC BLOOD PRESSURE: 84 MMHG | HEART RATE: 90 BPM | OXYGEN SATURATION: 96 % | WEIGHT: 162.7 LBS | SYSTOLIC BLOOD PRESSURE: 129 MMHG

## 2021-04-22 DIAGNOSIS — K74.69 OTHER CIRRHOSIS OF LIVER (H): ICD-10-CM

## 2021-04-22 PROCEDURE — 99204 OFFICE O/P NEW MOD 45 MIN: CPT | Performed by: TRANSPLANT SURGERY

## 2021-04-22 RX ORDER — ASCORBIC ACID 500 MG
500 TABLET ORAL
COMMUNITY
Start: 2021-02-23

## 2021-04-22 RX ORDER — FERROUS SULFATE 325(65) MG
325 TABLET ORAL
COMMUNITY
Start: 2020-11-05

## 2021-04-22 RX ORDER — MIRTAZAPINE 15 MG/1
15 TABLET, FILM COATED ORAL
COMMUNITY
Start: 2021-02-23

## 2021-04-22 NOTE — PROGRESS NOTES
Left another voice mail for patient.    Liver referral, seen by Dr. Leventhal, scheduled for consult only with surgery prior to consideration of full eval for liver txp    Have made several attempts to connect with patient for referral, no return calls.

## 2021-04-22 NOTE — PROGRESS NOTES
Attestation:  - HCV cirrhosis, s/p treatment with SVR  - MELD 10, main symptom - ascites  - Multiple abdominal surgeries (total abdominal colectomy for toxic megacolon, ileostomy, reversal, splenectomy for ITP, hernia repairs (currently no mesh per pt), but has chronic wounds above navel - no evidence of infection; although pt has multiple abd surgeries, this would not be contraindication for DD or LD tx  - Multiple auto-immune disorders: ITP, myoasthenia gravis, pt ended up in ICU on high-flow oxygen after muscle biopsy  - H/o heart failure  - chronic C-diff      Major concerns to be addressed in transplant discussion: h/o heart failure; myoasthenia gravis affecting her pulmonary function; opioid dependence    - if pt's symptoms can be controlled with medical management or if TIPS an option in the future for ascites control, I would be inclined to recommend these options.  Patient has been seen, evaluated and examined by me with resident/fellow/PA.   Vital signs, labs, medications and orders were reviewed.   When obtained, diagnostic images were reviewed by me and interpreted as above.    The care plan was discussed with the multidisciplinary team and I agree with the findings and plan in this note with any differences recorded in blue.        Erickson Bragg MD            Assessment and Plan:  1. liver transplant evaluation - patient is a fair candidate overall. Benefits and surgical risks of a liver transplantation were discussed.  2.  End stage liver disease due to cirrohsis of the liver due to hepatitis C    Surgical evaluation:   1. Portal Vein:Patent  2. Hepatic Artery: Open  3. TIPS: absent  4. Previous Abdominal Surgery: Yes . Abdominal colectomy, ileostomy with reversal and splenectomy for ITP; cholecystectomy    5. Hepatocellular Carcinoma: None  6. Ascites: Present - minimal  7. Costal Angle: wide  8. Portopulmonary Hypertension: absent  9. Hepatopulmonary Syndrome: absent  10. Cardiac Evaluation:  cardiology consult  11. Nutritional Status: Poor  12. Diabetes: No  13.Hypertension No  14. Smoker: No  14: Fraility index: Yes  15. Meets guidelines to receive Living Donor  Yes  16. Potential Living donors: unknown     Recommendations:   - cardiac eval  - pulmonary/liver tx anesthesia eval  - opioid dependence - needs SW      Patients overall evaluation will be discussed at the Liver Transplant selection committee meeting with a final recommendation on the patients suitability for transplant to be made at that time.    Shara Dickson, CNP    Consult Full  Details:  Iris Cote was seen in consultation at the request of Dr. Leventhal for evaluation as a potential liver transplant recipient.    Reason for Visit:  Iris Cote is a 41 year old year old female with cirrohsis of the liver due to hepatitis C, who presents for liver transplant evaluation.    HPI:  Presenting complaint: easy fatiguablity and loss of muscle mass    Past Medical History:   Diagnosis Date     Acute idiopathic thrombocytopenic purpura (H)     s/p splenectomy 2002     Anxiety      Anxiety and depression      Ascites      CKD (chronic kidney disease) stage 3, GFR 30-59 ml/min      COPD (chronic obstructive pulmonary disease) (H)      Depression      Hepatic cirrhosis due to chronic hepatitis C infection (H)      History of blood transfusion      History of cocaine abuse (H)      History of heart failure      History of hepatitis C Dx 2014    SVR after 12 weeks Harvoni 2017     Hypertension      Nicotine dependence      Opioid dependence on maintenance agonist therapy, no symptoms (H)     Was on methadone (developed V Tach), now on suboxone     Recurrent colitis due to Clostridioides difficile     complicated by megacolon s/p colectomy 2008     Seizure disorder (H)      Seizures (H)      Past Surgical History:   Procedure Laterality Date     APPENDECTOMY  2004     CHOLECYSTECTOMY  2013     COLOSTOMY      colon removal,  colostomnyin [id number], reversed in 2009     GI SURGERY  2002    splenectomy     HC EXCISION INFECTED MESH, ABDOMEN  2015     SPLENECTOMY  2002     Past Surgical History:   Procedure Laterality Date     APPENDECTOMY  2004     CHOLECYSTECTOMY  2013     COLOSTOMY      colon removal, colostomnyin [id number], reversed in 2009     GI SURGERY  2002    splenectomy     HC EXCISION INFECTED MESH, ABDOMEN  2015     SPLENECTOMY  2002     Family History   Problem Relation Age of Onset     Lung Cancer Maternal Grandfather      Liver Disease No family hx of      Allergies   Allergen Reactions     Compazine      Dilantin [Phenytoin]      rash     Lamictal [Lamotrigine]      rash     Sulfa Drugs      rash     Zosyn      Lips swelling, throat swelling     Ativan Anxiety     Prior to Admission medications    Medication Sig Start Date End Date Taking? Authorizing Provider   albuterol (2.5 MG/3ML) 0.083% nebulizer solution Take 3 mLs by nebulization every 6 hours as needed for shortness of breath / dyspnea. 4/25/11  Yes Luigi Morales PA-C   Albuterol Sulfate (PROVENTIL HFA) 108 (90 BASE) MCG/ACT AERS Inhale 2 puffs into the lungs every 6 hours. 4/25/11  Yes Luigi Morales PA-C   aspirin 81 MG tablet Take by mouth daily   Yes Reported, Patient   buprenorphine-naloxone (SUBOXONE) 8-2 MG SUBL sublingual tablet Place 1 tablet under the tongue   Yes Reported, Patient   carvedilol (COREG) 12.5 MG tablet Take 12.5 mg by mouth 10/14/20  Yes Reported, Patient   cloNIDine (CATAPRES) 0.1 MG tablet Take 0.1 mg by mouth 2 times daily 3/11/21  Yes Reported, Patient   diphenoxylate-atropine (LOMOTIL) 2.5-0.025 MG per tablet Take 2 tablets by mouth 4 times daily   Yes Reported, Patient   ferrous sulfate (FEROSUL) 325 (65 Fe) MG tablet Take 325 mg by mouth 11/5/20  Yes Reported, Patient   Gabapentin (NEURONTIN PO) Take 600 mg by mouth 3 times daily    Yes Reported, Patient   mirtazapine (REMERON) 15 MG tablet Take 15 mg by mouth 2/23/21  Yes  Reported, Patient   OMEPRAZOLE PO Take 20 mg by mouth 2 times daily (before meals)   Yes Reported, Patient   sertraline (ZOLOFT) 50 MG tablet Take 50 mg by mouth 2/23/21  Yes Reported, Patient   vitamin C (ASCORBIC ACID) 500 MG tablet Take 500 mg by mouth 2/23/21  Yes Reported, Patient   Calcium Polycarbophil (FIBER-LAX PO) Take by mouth 3 times daily    Reported, Patient   DoxePIN (SINEQUAN) 50 MG capsule Take 50 mg by mouth At Bedtime    Reported, Patient   Escitalopram Oxalate (LEXAPRO PO) Take 20 mg by mouth    Reported, Patient   hydrOXYzine (ATARAX) 50 MG tablet Take 1 tablet (50 mg) by mouth every 6 hours as needed (pruitus, anxiety)  Patient not taking: Reported on 3/15/2021 9/17/13   Lashonda Westfall DO   ibuprofen (ADVIL,MOTRIN) 400 MG tablet Take 1 tablet (400 mg) by mouth every 6 hours as needed  Patient not taking: Reported on 3/15/2021 9/17/13   Lashonda Westfall DO   Loratadine (CLARITIN PO)     Reported, Patient   METHADONE HCL PO Take 180 mg by mouth daily    Reported, Patient   Prazosin HCl (MINIPRESS PO) Take by mouth At Bedtime    Reported, Patient   PROMETHAZINE HCL RE     Reported, Patient       Previous Transplant Hx: No    Cardiovascular Hx:       h/o Cardiac Issues: No       Exercise Tolerance: chest pain and shortness of breath with exertion.    Potential Donor(s): No    ROS:    REVIEW OF SYSTEMS (check box if normal)  [x]                GENERAL  [x]                  PULMONARY [x]                 GENITOURINARY  [x]                 CNS                 [x]                  CARDIAC  [x]                  ENDOCRINE  [x]                 EARS,NOSE,THROAT [x]                  GASTROINTESTINAL [x]                  NEUROLOGIC    [x]                 MUSCLOSKELTAL  [x]                   HEMATOLOGY    Examination:     Vitals:  /84   Pulse 90   Wt 73.8 kg (162 lb 11.2 oz)   SpO2 96%   BMI 26.26 kg/m      GENERAL APPEARANCE: alert and no distress  EYES: PERRL  HENT: mouth without ulcers or  lesions  NECK: supple, no adenopathy  RESP: lungs clear to auscultation - no rales, rhonchi or wheezes  CV: regular rhythm, normal rate, no rub   ABDOMEN: well-healed incisions abdominal wound noted  MS: extremities normal- no gross deformities noted, no evidence of inflammation in joints, no muscle tenderness  SKIN: no rash  NEURO: Normal strength and tone, sensory exam grossly normal, mentation intact and speech normal  PSYCH: mentation appears normal. and affect normal/bright      Results: No results found for this or any previous visit (from the past 168 hour(s)).  I had a long discussion with the patient regarding liver transplantation which included but was not limited to  the following points:    1. Liver transplant selection committee process.  2. The federal rules for cadaveric waiting list, the size and blood type matching of the organ. The availability of living-related donor transplantation.  3. The types of donors: brain death donors, non-heart beating donors, partial liver grafts: splits and living donor grafts  4. Extended criteria  Donors (older age, steasosis) and the increased  risk of primary non-function using the extended criteria donors  5. The CDC high risk donors,  Risk of donor transmitted infections and donor transmitted malignancy  6. The liver transplant operation and the associated risks and technical complications which can include intraoperative death, post operative death,  Primary non-function, bleeding requiring re-operations, arterial and biliary complications, bowel perforations, and intra abdominal abscess. Some of these complicaitons may require a second operation.  7. The postoperative course, the ICU stay and risk of postoperative complications which can include sepsis, MI, stroke, brain injury, pneumonia, pleural effusions, and renal dysfunction.  8. The current 1 year and 5 year graft and patient survivals.  9. The need for life long immunosuppressive therapy and the side  effects of these medications, including the possibility of toxicity, opportunistic infections, risk of cancer including lymphoma, and the possibility of rejection even if the patient is taking the medication exactly as prescribed.  10. The need for compliance with medications and follow-up visits in the clinic and thereafter.  11. The patient and family understand these risks and wish to proceed to transplantation       I spent .......minutes with the patient and more than 50% of the time was spend in direct face to face counseling.

## 2021-04-22 NOTE — TELEPHONE ENCOUNTER
Patient returned calls, coming in for surgical consult today    LIVER DISEASE Hep C  REFERRING PROVIDER Leventhal    -----------------------------------------------------------------------------------------------------------------------------  MELD  10 DATE OF MELD LABS       ABO O A B AB unk      Labs from March 5, 2021: Sodium 136, bicarb 28, creatinine 1.32, AST 42, ALT 13, total bilirubin 0.2, alkaline phosphatase 198, WBC 11.4, hemoglobin 8.5, MCV 78.5, platelets 767, INR 1.1, albumin 2.8, alpha-fetoprotein 3    HISTORY OF LIVER DISEASE  Dx with liver fibrosis in 2014, s/p Harvoni tx for Hep C;  Started with fluid overload issues las September, several hospitalization related to fluid overload, but multiple hospitalizations unrelated to liver as well.       Ascites  ONly getting manny when in hospital- last was in December;  Fluid status improve since adding Aldactone in addition to Lasix after this hospitalization Aldactone      HE  None    Kidney function creat 1.32, follows with outside nephrology  Variceal screening Last EGD. Location. Varices no/yes. Rec follow up.  HCC Screening   Alcohol use no ETOH use, h/o cocaine use quit Aug 2019    ultrasound done 12/2021    Hospitalizations  - multiple hospitalizations and surgeries:    - splenectomy in 2002 for ITP  'blood clot on aorta', was on Eloquis then developed GI bleed in December and off;   - CDiff > toxic megacolon > total colectomy in 2008, ileostomy [id number] then revered; hernia repair x 2 with failed mesh; chronic abdominal wound   - short bowel syndrome  ---------------------------------------------------------------------------------------------------------------------------------  PMH  -COPD  -cocaine use- was on methadone (developed VTach from methadone, now on suboxone)   - sz disorder- last sz 10 yrs ago+, on gabapenin  Diabetes no   Abdominal surgery -see above      SHX  Live in apartment with mom who helps with cares  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

## 2021-04-22 NOTE — LETTER
4/22/2021         RE: Iris Cote  [address]  [address]  [place] [address]        Dear Colleague,    Thank you for referring your patient, Iris Cote, to the [facility]. Please see a copy of my visit note below.    Attestation:  - HCV cirrhosis, s/p treatment with SVR  - MELD 10, main symptom - ascites  - Multiple abdominal surgeries (total abdominal colectomy for toxic megacolon, ileostomy, reversal, splenectomy for ITP, hernia repairs (currently no mesh per pt), but has chronic wounds above navel - no evidence of infection; although pt has multiple abd surgeries, this would not be contraindication for DD or LD tx  - Multiple auto-immune disorders: ITP, myoasthenia gravis, pt ended up in ICU on high-flow oxygen after muscle biopsy  - H/o heart failure  - chronic C-diff      Major concerns to be addressed in transplant discussion: h/o heart failure; myoasthenia gravis affecting her pulmonary function; opioid dependence    - if pt's symptoms can be controlled with medical management or if TIPS an option in the future for ascites control, I would be inclined to recommend these options.  Patient has been seen, evaluated and examined by me with resident/fellow/PA.   Vital signs, labs, medications and orders were reviewed.   When obtained, diagnostic images were reviewed by me and interpreted as above.    The care plan was discussed with the multidisciplinary team and I agree with the findings and plan in this note with any differences recorded in blue.        Erickson Bragg MD            Assessment and Plan:  1. liver transplant evaluation - patient is a fair candidate overall. Benefits and surgical risks of a liver transplantation were discussed.  2.  End stage liver disease due to cirrohsis of the liver due to hepatitis C    Surgical evaluation:   1. Portal Vein:Patent  2. Hepatic Artery: Open  3. TIPS: absent  4. Previous Abdominal Surgery: Yes .  Abdominal colectomy, ileostomy with reversal and splenectomy for ITP; cholecystectomy    5. Hepatocellular Carcinoma: None  6. Ascites: Present - minimal  7. Costal Angle: wide  8. Portopulmonary Hypertension: absent  9. Hepatopulmonary Syndrome: absent  10. Cardiac Evaluation: cardiology consult  11. Nutritional Status: Poor  12. Diabetes: No  13.Hypertension No  14. Smoker: No  14: Fraility index: Yes  15. Meets guidelines to receive Living Donor  Yes  16. Potential Living donors: unknown     Recommendations:   - cardiac eval  - pulmonary/liver tx anesthesia eval  - opioid dependence - needs SW      Patients overall evaluation will be discussed at the Liver Transplant selection committee meeting with a final recommendation on the patients suitability for transplant to be made at that time.    Shara Dickson, CNP    Consult Full  Details:  Iris Cote was seen in consultation at the request of Dr. Leventhal for evaluation as a potential liver transplant recipient.    Reason for Visit:  Iris Cote is a 41 year old year old female with cirrohsis of the liver due to hepatitis C, who presents for liver transplant evaluation.    HPI:  Presenting complaint: easy fatiguablity and loss of muscle mass    Past Medical History:   Diagnosis Date     Acute idiopathic thrombocytopenic purpura (H)     s/p splenectomy 2002     Anxiety      Anxiety and depression      Ascites      CKD (chronic kidney disease) stage 3, GFR 30-59 ml/min      COPD (chronic obstructive pulmonary disease) (H)      Depression      Hepatic cirrhosis due to chronic hepatitis C infection (H)      History of blood transfusion      History of cocaine abuse (H)      History of heart failure      History of hepatitis C Dx 2014    SVR after 12 weeks Harvoni 2017     Hypertension      Nicotine dependence      Opioid dependence on maintenance agonist therapy, no symptoms (H)     Was on methadone (developed V Tach), now on suboxone      Recurrent colitis due to Clostridioides difficile     complicated by megacolon s/p colectomy 2008     Seizure disorder (H)      Seizures (H)      Past Surgical History:   Procedure Laterality Date     APPENDECTOMY  2004     CHOLECYSTECTOMY  2013     COLOSTOMY      colon removal, colostomnyin [id number], reversed in 2009     GI SURGERY  2002    splenectomy     HC EXCISION INFECTED MESH, ABDOMEN  2015     SPLENECTOMY  2002     Past Surgical History:   Procedure Laterality Date     APPENDECTOMY  2004     CHOLECYSTECTOMY  2013     COLOSTOMY      colon removal, colostomnyin [id number], reversed in 2009     GI SURGERY  2002    splenectomy     HC EXCISION INFECTED MESH, ABDOMEN  2015     SPLENECTOMY  2002     Family History   Problem Relation Age of Onset     Lung Cancer Maternal Grandfather      Liver Disease No family hx of      Allergies   Allergen Reactions     Compazine      Dilantin [Phenytoin]      rash     Lamictal [Lamotrigine]      rash     Sulfa Drugs      rash     Zosyn      Lips swelling, throat swelling     Ativan Anxiety     Prior to Admission medications    Medication Sig Start Date End Date Taking? Authorizing Provider   albuterol (2.5 MG/3ML) 0.083% nebulizer solution Take 3 mLs by nebulization every 6 hours as needed for shortness of breath / dyspnea. 4/25/11  Yes Luigi Morales PA-C   Albuterol Sulfate (PROVENTIL HFA) 108 (90 BASE) MCG/ACT AERS Inhale 2 puffs into the lungs every 6 hours. 4/25/11  Yes Luigi Morales PA-C   aspirin 81 MG tablet Take by mouth daily   Yes Reported, Patient   buprenorphine-naloxone (SUBOXONE) 8-2 MG SUBL sublingual tablet Place 1 tablet under the tongue   Yes Reported, Patient   carvedilol (COREG) 12.5 MG tablet Take 12.5 mg by mouth 10/14/20  Yes Reported, Patient   cloNIDine (CATAPRES) 0.1 MG tablet Take 0.1 mg by mouth 2 times daily 3/11/21  Yes Reported, Patient   diphenoxylate-atropine (LOMOTIL) 2.5-0.025 MG per tablet Take 2 tablets by mouth 4 times daily   Yes  Reported, Patient   ferrous sulfate (FEROSUL) 325 (65 Fe) MG tablet Take 325 mg by mouth 11/5/20  Yes Reported, Patient   Gabapentin (NEURONTIN PO) Take 600 mg by mouth 3 times daily    Yes Reported, Patient   mirtazapine (REMERON) 15 MG tablet Take 15 mg by mouth 2/23/21  Yes Reported, Patient   OMEPRAZOLE PO Take 20 mg by mouth 2 times daily (before meals)   Yes Reported, Patient   sertraline (ZOLOFT) 50 MG tablet Take 50 mg by mouth 2/23/21  Yes Reported, Patient   vitamin C (ASCORBIC ACID) 500 MG tablet Take 500 mg by mouth 2/23/21  Yes Reported, Patient   Calcium Polycarbophil (FIBER-LAX PO) Take by mouth 3 times daily    Reported, Patient   DoxePIN (SINEQUAN) 50 MG capsule Take 50 mg by mouth At Bedtime    Reported, Patient   Escitalopram Oxalate (LEXAPRO PO) Take 20 mg by mouth    Reported, Patient   hydrOXYzine (ATARAX) 50 MG tablet Take 1 tablet (50 mg) by mouth every 6 hours as needed (pruitus, anxiety)  Patient not taking: Reported on 3/15/2021 9/17/13   Lashonda Westfall DO   ibuprofen (ADVIL,MOTRIN) 400 MG tablet Take 1 tablet (400 mg) by mouth every 6 hours as needed  Patient not taking: Reported on 3/15/2021 9/17/13   Lashonda Westfall DO   Loratadine (CLARITIN PO)     Reported, Patient   METHADONE HCL PO Take 180 mg by mouth daily    Reported, Patient   Prazosin HCl (MINIPRESS PO) Take by mouth At Bedtime    Reported, Patient   PROMETHAZINE HCL RE     Reported, Patient       Previous Transplant Hx: No    Cardiovascular Hx:       h/o Cardiac Issues: No       Exercise Tolerance: chest pain and shortness of breath with exertion.    Potential Donor(s): No    ROS:    REVIEW OF SYSTEMS (check box if normal)  [x]                GENERAL  [x]                  PULMONARY [x]                 GENITOURINARY  [x]                 CNS                 [x]                  CARDIAC  [x]                  ENDOCRINE  [x]                 EARS,NOSE,THROAT [x]                  GASTROINTESTINAL [x]                  NEUROLOGIC     [x]                 MUSCLOSKELTAL  [x]                   HEMATOLOGY    Examination:     Vitals:  /84   Pulse 90   Wt 73.8 kg (162 lb 11.2 oz)   SpO2 96%   BMI 26.26 kg/m      GENERAL APPEARANCE: alert and no distress  EYES: PERRL  HENT: mouth without ulcers or lesions  NECK: supple, no adenopathy  RESP: lungs clear to auscultation - no rales, rhonchi or wheezes  CV: regular rhythm, normal rate, no rub   ABDOMEN: well-healed incisions abdominal wound noted  MS: extremities normal- no gross deformities noted, no evidence of inflammation in joints, no muscle tenderness  SKIN: no rash  NEURO: Normal strength and tone, sensory exam grossly normal, mentation intact and speech normal  PSYCH: mentation appears normal. and affect normal/bright      Results: No results found for this or any previous visit (from the past 168 hour(s)).  I had a long discussion with the patient regarding liver transplantation which included but was not limited to  the following points:    1. Liver transplant selection committee process.  2. The federal rules for cadaveric waiting list, the size and blood type matching of the organ. The availability of living-related donor transplantation.  3. The types of donors: brain death donors, non-heart beating donors, partial liver grafts: splits and living donor grafts  4. Extended criteria  Donors (older age, steasosis) and the increased  risk of primary non-function using the extended criteria donors  5. The CDC high risk donors,  Risk of donor transmitted infections and donor transmitted malignancy  6. The liver transplant operation and the associated risks and technical complications which can include intraoperative death, post operative death,  Primary non-function, bleeding requiring re-operations, arterial and biliary complications, bowel perforations, and intra abdominal abscess. Some of these complicaitons may require a second operation.  7. The postoperative course, the ICU stay and  risk of postoperative complications which can include sepsis, MI, stroke, brain injury, pneumonia, pleural effusions, and renal dysfunction.  8. The current 1 year and 5 year graft and patient survivals.  9. The need for life long immunosuppressive therapy and the side effects of these medications, including the possibility of toxicity, opportunistic infections, risk of cancer including lymphoma, and the possibility of rejection even if the patient is taking the medication exactly as prescribed.  10. The need for compliance with medications and follow-up visits in the clinic and thereafter.  11. The patient and family understand these risks and wish to proceed to transplantation       I spent .......minutes with the patient and more than 50% of the time was spend in direct face to face counseling.        Again, thank you for allowing me to participate in the care of your patient.        Sincerely,        Erickson Bragg MD

## 2021-04-22 NOTE — NURSING NOTE
Chief Complaint   Patient presents with     Consult     Pre liver     Blood pressure 129/84, pulse 90, weight 73.8 kg (162 lb 11.2 oz), SpO2 96 %.    Zainab Lewis on 4/22/2021 at 2:41 PM

## 2021-05-04 ENCOUNTER — COMMITTEE REVIEW (OUTPATIENT)
Dept: TRANSPLANT | Facility: CLINIC | Age: 41
End: 2021-05-04

## 2021-05-04 NOTE — COMMITTEE REVIEW
Abdominal Committee Review Note     Evaluation Date:   Committee Review Date: 5/4/2021    Organ being evaluated for: Liver    Transplant Phase: Referral  Transplant Status: Active    Transplant Coordinator: Charlene Coreas  Transplant Surgeon:       Referring Physician: Jeferson Germain    Primary Diagnosis:   Secondary Diagnosis:     Committee Review Members:  Anesthesiology Marbin Pennington MD   Hepatology Madelaine Jin MD   Nutrition Natalia Dunlap,    Pharmacy Grant Bunn, [facility]    - Clinical Karolyn Nicholson, [facility]UST[facility], Breana Woodard, [facility]   Transplant Sonam Ortiz, RN, Charlene Coreas, RN, Viky Daly MD, Lacy Hughes, RN, Winnie Conde, JAKI, Keon Corey MD, Jr Ayush Stephenson, JAKI, Theresa Lobato MD, Gia Sanchez, APRN CNP, Aleena Rod, RN, Greta Patten MD, Greta Gimenez MD, Malik Carvalho MD, Edmundo Franz MD, Gene Ruiz MD, Thomas M. Leventhal, MD, Reji Root MD, Erickson Bragg MD       Transplant Eligibility:     Committee Review Decision: Declined    Relative Contraindications:     Absolute Contraindications:     Committee Chair Leventhal, Thomas Michael, MD verbally attested to the committee's decision.    Committee Discussion Details:      Close referral for now, continue medical management at this time- schedule follow up with Hepatology in 3 mos from last appt (due mid-June); spoke with patient and she is aware that we will not proceed to full eval at this time.      Will have imaging from outside sent to PACS

## 2021-09-26 ENCOUNTER — HEALTH MAINTENANCE LETTER (OUTPATIENT)
Age: 41
End: 2021-09-26

## 2022-01-16 ENCOUNTER — HEALTH MAINTENANCE LETTER (OUTPATIENT)
Age: 42
End: 2022-01-16

## 2023-01-01 ENCOUNTER — HEALTH MAINTENANCE LETTER (OUTPATIENT)
Age: 43
End: 2023-01-01

## 2023-01-01 ENCOUNTER — OFFICE VISIT (OUTPATIENT)
Dept: GASTROENTEROLOGY | Facility: CLINIC | Age: 43
End: 2023-01-01
Attending: INTERNAL MEDICINE
Payer: COMMERCIAL

## 2023-01-01 ENCOUNTER — TELEPHONE (OUTPATIENT)
Dept: TRANSPLANT | Facility: CLINIC | Age: 43
End: 2023-01-01
Payer: COMMERCIAL

## 2023-01-01 ENCOUNTER — LAB (OUTPATIENT)
Dept: LAB | Facility: CLINIC | Age: 43
End: 2023-01-01
Attending: INTERNAL MEDICINE
Payer: COMMERCIAL

## 2023-01-01 VITALS
HEIGHT: 66 IN | DIASTOLIC BLOOD PRESSURE: 83 MMHG | SYSTOLIC BLOOD PRESSURE: 127 MMHG | WEIGHT: 160 LBS | HEART RATE: 79 BPM | BODY MASS INDEX: 25.71 KG/M2

## 2023-01-01 DIAGNOSIS — Z01.818 ENCOUNTER FOR PRE-TRANSPLANT EVALUATION FOR CHRONIC LIVER DISEASE: ICD-10-CM

## 2023-01-01 DIAGNOSIS — N18.30 STAGE 3 CHRONIC KIDNEY DISEASE, UNSPECIFIED WHETHER STAGE 3A OR 3B CKD (H): Primary | ICD-10-CM

## 2023-01-01 DIAGNOSIS — I82.4Y2 ACUTE DEEP VEIN THROMBOSIS (DVT) OF PROXIMAL VEIN OF LEFT LOWER EXTREMITY (H): ICD-10-CM

## 2023-01-01 DIAGNOSIS — K74.60 CIRRHOSIS OF LIVER (H): Primary | ICD-10-CM

## 2023-01-01 DIAGNOSIS — K74.60 CHRONIC HEPATITIS C VIRUS INFECTION WITH CIRRHOSIS (H): ICD-10-CM

## 2023-01-01 DIAGNOSIS — K74.60 CIRRHOSIS OF LIVER (H): ICD-10-CM

## 2023-01-01 DIAGNOSIS — B18.2 CHRONIC HEPATITIS C VIRUS INFECTION WITH CIRRHOSIS (H): ICD-10-CM

## 2023-01-01 DIAGNOSIS — R18.8 OTHER ASCITES: ICD-10-CM

## 2023-01-01 DIAGNOSIS — E44.0 MODERATE PROTEIN-CALORIE MALNUTRITION (H): ICD-10-CM

## 2023-01-01 DIAGNOSIS — K92.2 RECURRENT GASTROINTESTINAL HEMORRHAGE: ICD-10-CM

## 2023-01-01 DIAGNOSIS — F19.90 SUBSTANCE USE DISORDER: ICD-10-CM

## 2023-01-01 LAB
AFP SERPL-MCNC: 2 NG/ML
ALBUMIN SERPL BCG-MCNC: 2.1 G/DL (ref 3.5–5.2)
ALP SERPL-CCNC: 106 U/L (ref 35–104)
ALT SERPL W P-5'-P-CCNC: 8 U/L (ref 10–35)
ANION GAP SERPL CALCULATED.3IONS-SCNC: 7 MMOL/L (ref 7–15)
AST SERPL W P-5'-P-CCNC: 21 U/L (ref 10–35)
BILIRUB DIRECT SERPL-MCNC: <0.2 MG/DL (ref 0–0.3)
BILIRUB SERPL-MCNC: <0.2 MG/DL
BUN SERPL-MCNC: 15.7 MG/DL (ref 6–20)
CALCIUM SERPL-MCNC: 8 MG/DL (ref 8.6–10)
CHLORIDE SERPL-SCNC: 109 MMOL/L (ref 98–107)
CREAT SERPL-MCNC: 1.34 MG/DL (ref 0.51–0.95)
DEPRECATED HCO3 PLAS-SCNC: 25 MMOL/L (ref 22–29)
ERYTHROCYTE [DISTWIDTH] IN BLOOD BY AUTOMATED COUNT: 17.4 % (ref 10–15)
GFR SERPL CREATININE-BSD FRML MDRD: 51 ML/MIN/1.73M2
GLUCOSE SERPL-MCNC: 68 MG/DL (ref 70–99)
HCT VFR BLD AUTO: 28.8 % (ref 35–47)
HGB BLD-MCNC: 9 G/DL (ref 11.7–15.7)
INR PPP: 0.93 (ref 0.85–1.15)
MCH RBC QN AUTO: 26.4 PG (ref 26.5–33)
MCHC RBC AUTO-ENTMCNC: 31.3 G/DL (ref 31.5–36.5)
MCV RBC AUTO: 85 FL (ref 78–100)
PLATELET # BLD AUTO: 360 10E3/UL (ref 150–450)
PLPETH BLD-MCNC: <10 NG/ML
POPETH BLD-MCNC: 14 NG/ML
POTASSIUM SERPL-SCNC: 4 MMOL/L (ref 3.4–5.3)
PROT SERPL-MCNC: 5.3 G/DL (ref 6.4–8.3)
RBC # BLD AUTO: 3.41 10E6/UL (ref 3.8–5.2)
SODIUM SERPL-SCNC: 141 MMOL/L (ref 136–145)
WBC # BLD AUTO: 11.9 10E3/UL (ref 4–11)

## 2023-01-01 PROCEDURE — 99215 OFFICE O/P EST HI 40 MIN: CPT | Performed by: INTERNAL MEDICINE

## 2023-01-01 PROCEDURE — 80321 ALCOHOLS BIOMARKERS 1OR 2: CPT | Mod: 90 | Performed by: PATHOLOGY

## 2023-01-01 PROCEDURE — 36415 COLL VENOUS BLD VENIPUNCTURE: CPT | Performed by: PATHOLOGY

## 2023-01-01 PROCEDURE — 99359 PROLONG SERV W/O CONTACT ADD: CPT | Performed by: INTERNAL MEDICINE

## 2023-01-01 PROCEDURE — 82248 BILIRUBIN DIRECT: CPT | Performed by: PATHOLOGY

## 2023-01-01 PROCEDURE — G0463 HOSPITAL OUTPT CLINIC VISIT: HCPCS | Performed by: INTERNAL MEDICINE

## 2023-01-01 PROCEDURE — 82105 ALPHA-FETOPROTEIN SERUM: CPT | Mod: 90 | Performed by: PATHOLOGY

## 2023-01-01 PROCEDURE — 99000 SPECIMEN HANDLING OFFICE-LAB: CPT | Performed by: PATHOLOGY

## 2023-01-01 PROCEDURE — 85610 PROTHROMBIN TIME: CPT | Performed by: PATHOLOGY

## 2023-01-01 PROCEDURE — 99358 PROLONG SERVICE W/O CONTACT: CPT | Performed by: INTERNAL MEDICINE

## 2023-01-01 PROCEDURE — 80053 COMPREHEN METABOLIC PANEL: CPT | Performed by: PATHOLOGY

## 2023-01-01 PROCEDURE — 85027 COMPLETE CBC AUTOMATED: CPT | Performed by: PATHOLOGY

## 2023-01-01 RX ORDER — PROMETHAZINE HYDROCHLORIDE 25 MG/1
25 TABLET ORAL
COMMUNITY
Start: 2022-01-01

## 2023-01-01 RX ORDER — PREGABALIN 75 MG/1
75 CAPSULE ORAL 3 TIMES DAILY
COMMUNITY
Start: 2023-01-01

## 2023-01-01 RX ORDER — MIDODRINE HYDROCHLORIDE 10 MG/1
10 TABLET ORAL
COMMUNITY
Start: 2022-01-01

## 2023-01-01 RX ORDER — HYDROXYZINE PAMOATE 25 MG/1
25 CAPSULE ORAL
COMMUNITY
Start: 2022-07-26

## 2023-01-01 ASSESSMENT — PAIN SCALES - GENERAL: PAINLEVEL: NO PAIN (0)

## 2023-02-02 NOTE — TELEPHONE ENCOUNTER
Contact Numbers  [facility]: [phone]    After Hours:  [phone]  Triage: [phone]    Please call the [facility] Triage line if you experience a temperature greater than or equal to 100.5, shaking chills, have uncontrolled nausea, vomiting and/or diarrhea, dizziness, shortness of breath, chest pain, bleeding, unexplained bruising, or if you have any other new/concerning symptoms, questions or concerns.     If it is after hours, weekends, or holidays, please call the main hospital  at  [phone] and ask to speak to the Oncology doctor on call.     If you are having any concerning symptoms or wish to speak to a provider before your next infusion visit, please call your care coordinator or triage to notify them so we can adequately serve you.     If you need a refill on a narcotic prescription or other medication, please call triage before your infusion appointment.         January 2017 Sunday Monday Tuesday Wednesday Thursday Friday Saturday   1     2     3     4     [facility] LAB DRAW   10:00 AM   (15 min.)   [facility] LAB DRAW   [facility] Lab Draw     [facility] ONC INFUSION 120   10:30 AM   (120 min.)    ONCOLOGY INFUSION   [facility] 5     6     LAB    8:30 AM   (15 min.)   AN LAB   [facility] 7       8     9     10     LAB    1:00 PM   (15 min.)    LAB   [facility] Lab     [facility] RETURN WITH ROOM    2:00 PM   (60 min.)   Vicki Chris LICSW   [facility]     [facility] RETURN    3:00 PM   (30 min.)   Darlene Peck MD   [facility] 11     [facility] LAB DRAW   10:15 AM   (15 min.)   [facility] DRAW   [facility] Lab Draw     [facility] ONC INFUSION 180   10:30 AM   (180 min.)    ONCOLOGY INFUSION   [facility] 12     13     LAB    8:45 AM   (15 min.)   AN LAB   [facility] 14       15     16     17     18     [facility] LAB DRAW   10:00 AM   (15 min.)     Patient formally evaluated April/May 2021.  Eval closed due to low MELD< but also multiple medical comorbidities.    Patient wants to be considered for potential re-evaluation, as she now has potential live donors.    Patient currently admitted with possible DVT, needing angio due to ischemia in foot, also HALLIE, pneumonia.      Given multiple comorbidities, will put patient in for follow up appointment with Dr Leventhal, slots held 3/23.  Will defer opening up transplant referral/evaluation until patient seen by Dr. Leventhal.     [facility] LAB DRAW   [facility] Draw     [facility] ONC INFUSION 120   10:30 AM   (120 min.)    ONCOLOGY INFUSION   [facility] 19     20     21       22     23     24     25     [facility] LAB DRAW   10:00 AM   (15 min.)    [facility] DRAW   [facility] Draw     [facility] ONC INFUSION 180   10:30 AM   (180 min.)    ONCOLOGY INFUSION   [facility] 26     [facility] RETURN ACTIVE TREATMENT    2:20 PM   (20 min.)   Erica Markham MD   [facility] 27     28       29     30     31 February 2017 Sunday Monday Tuesday Wednesday Thursday Friday Saturday                  1     [facility] LAB DRAW   10:00 AM   (15 min.)    [facility] LAB DRAW   [facility] Lab Draw     [facility] ONC INFUSION 120   10:30 AM   (120 min.)    ONCOLOGY INFUSION   [facility] 2     3     4       5     6     7     [facility] RETURN    1:00 PM   (30 min.)   Darlene Peck MD   [facility] 8     9     10     11       12     13     14     15     [facility] LAB DRAW    7:30 AM   (15 min.)    [facility] LAB DRAW   [facility] Draw     [facility] RETURN ACTIVE TREATMENT    8:00 AM   (40 min.)   Valentina Haney APRN CNP   [facility]     [facility] ONC INFUSION 180    9:00 AM   (180 min.)    ONCOLOGY INFUSION   [facility] 16     17     18       19     20     21     22     [facility] LAB DRAW    9:30 AM   (15 min.)    [facility] LAB DRAW   [facility] Lab Draw     [facility] ONC INFUSION 120   10:00 AM   (120 min.)    ONCOLOGY INFUSION   [facility] 23     24     25       26     27     28                                     Recent Results (from the past 24 hour(s))   CBC with platelets differential    Collection Time: 01/18/17 10:01 AM   Result Value Ref Range    WBC 4.4 4.0 - 11.0 10e9/L    RBC Count 3.84 3.8 - 5.2 10e12/L    Hemoglobin 9.7 (L) 11.7 - 15.7  g/dL    Hematocrit 32.2 (L) 35.0 - 47.0 %    MCV 84 78 - 100 fl    MCH 25.3 (L) 26.5 - 33.0 pg    MCHC 30.1 (L) 31.5 - 36.5 g/dL    RDW 27.0 (H) 10.0 - 15.0 %    Platelet Count 323 150 - 450 10e9/L    Diff Method Automated Method     % Neutrophils 60.7 %    % Lymphocytes 25.6 %    % Monocytes 10.7 %    % Eosinophils 2.3 %    % Basophils 0.5 %    % Immature Granulocytes 0.2 %    Nucleated RBCs 0 0 /100    Absolute Neutrophil 2.7 1.6 - 8.3 10e9/L    Absolute Lymphocytes 1.1 0.8 - 5.3 10e9/L    Absolute Monocytes 0.5 0.0 - 1.3 10e9/L    Absolute Eosinophils 0.1 0.0 - 0.7 10e9/L    Absolute Basophils 0.0 0.0 - 0.2 10e9/L    Abs Immature Granulocytes 0.0 0 - 0.4 10e9/L    Absolute Nucleated RBC 0.0    Magnesium    Collection Time: 01/18/17 10:01 AM   Result Value Ref Range    Magnesium 2.1 1.6 - 2.3 mg/dL   Potassium    Collection Time: 01/18/17 10:01 AM   Result Value Ref Range    Potassium 4.2 3.4 - 5.3 mmol/L   INR    Collection Time: 01/18/17 10:01 AM   Result Value Ref Range    INR 1.69 (H) 0.86 - 1.14

## 2023-03-23 NOTE — NURSING NOTE
"Chief Complaint   Patient presents with     RECHECK     Follow up with liver eval     /83   Pulse 79   Ht 1.676 m (5' 6\")   Wt 72.6 kg (160 lb)   BMI 25.82 kg/m    Shu Lopez CMA on 3/23/2023 at 8:20 AM    "

## 2023-03-23 NOTE — LETTER
3/23/2023         RE: Iris Cote  [address]  [address]  [facility] [address]        Dear Colleague,    Thank you for referring your patient, Iris Cote, to the [facility] [place]. Please see a copy of my visit note below.    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    She was seen in clinic initially in April 2021 for transplant evaluation.  She was discussed at the [facility] as liver transplant selection committee and was felt that based on her low MELD score that she did not overtly need liver transplantation at that time, and therefore was not placed on the transplant wait list.     Since last seen she has had significant changes in her overall health status.  She did undergo some significant social stressors in addition to her chronic medical stressors and did have relapse use of inhaled substances in early 2022.  She does remain on Suboxone, has reestablish care with a substance use counselor and continues with a sponsor and a recovery program.  She was admitted in December with concerns of significant volume overload and acute kidney injury.  She was then admitted again from January 15 through February 15 with concerns of left  lower extremity ischemia.  She had an extensive evaluation which demonstrated reduced MERY in her left lower extremity, and impaired tissue oxygen diffusion.  She had lower extremity ulcers noted.  Unfortunate during the hospitalization she also developed a DVT in the lower extremity.  She was started on anticoagulation, but unfortunately has had ongoing issues with overt obscure GI bleeding.  She is undergone multiple colonoscopies and endoscopies: The endoscopies did not demonstrate any overt evidence of varices and demonstrate mild portal hypertensive gastropathy.  The colonoscopy is continued to demonstrate areas of inflammation around the colonic anastomoses, that have required hemostatic clip placement in the past.  No other overt source of GI blood loss has been noted.  She was again admitted in late February with concerns of presyncope.  Its important note that during her hospitalization in January she was having issues with persistent hypotension.  Her carvedilol was discontinued and she was started on midodrine.  Her most recent admission was March 17 through March 20 again with GI bleed of unclear source.    Since that hospitalization she has made the decision to hold her anticoagulation given her perception of risk versus benefits.    She notes she has intermittent episodes of feeling dizziness or fatigue, but not sure if she is experiencing overt changes in mental status.  She does have ongoing issues with volume overload: Diuretics have been managed primarily by nephrology.  She does require paracentesis with approximately 5 to 6 L removed every week, and has been in this routine for almost the past 2 years.    History of Liver Disease:  Diagnosis chronic hepatitis C in 2014.  Underwent treatment in 2017 with 12 weeks of Harvoni and obtained SVR.  Labs checked last in October 2020 with negative hep C viral level.  She had decompensated disease prior to her treatment with ascites, and has had less than a  half dozen paracentesis in her lifetime.  Her last paracentesis was in December during her hospitalization, and she was started on Lasix and spironolactone at that time and has not had issues with further volume overload.  She has not had issues with impaired memory or concentration.  An EGD performed in December 2020 did not demonstrate any evidence of esophageal varices.  Abdominal imaging was performed in December 2020 and did not demonstrate any evidence of hepatocellular carcinoma.    She had recurrent issues with C. difficile in the past and unfortunately developed toxic megacolon requiring an almost total colectomy in 2008.  She had a colostomy for approximately 1 year and then had a takedown.  She is also had abdominal surgeries including: An appendectomy in 2004, cholecystectomy, and a splenectomy for ITP back in 2002.  She had significant mesh use for abdominal hernias and this was revised in 2015/2016 -unfortunately she has had issues with a chronic nonhealing abdominal wound since this time.  She packs the wound and has a topical dressing that she uses and changes frequently.  She was followed by the wound clinic at [facility] for the several past years.    She notes she does carry a history of COPD and does have a historical diagnosis of heart failure.  The diagnosis of heart failure was given to her when she was living in [place], and notes at that time that she had developed intermittent V. tach which was felt related to her chronic methadone use.  Methadone was transitioned to Suboxone and she was started on carvedilol and she does not believe she has any issues with heart failure at this time.    Past Medical History:  Past Medical History:   Diagnosis Date     Acute idiopathic thrombocytopenic purpura (H)     s/p splenectomy 2002     Anxiety      Anxiety and depression      Ascites      CKD (chronic kidney disease) stage 3, GFR 30-59 ml/min (H)      COPD (chronic obstructive pulmonary disease) (H)       Depression      Hepatic cirrhosis due to chronic hepatitis C infection (H)      History of blood transfusion      History of cocaine abuse (H)      History of heart failure      History of hepatitis C Dx 2014    SVR after 12 weeks Harvoni 2017     Hypertension      Nicotine dependence      Opioid dependence on maintenance agonist therapy, no symptoms (H)     Was on methadone (developed V Tach), now on suboxone     Recurrent colitis due to Clostridioides difficile     complicated by megacolon s/p colectomy 2008     Seizure disorder (H)      Seizures (H)        Surgical History:  Past Surgical History:   Procedure Laterality Date     APPENDECTOMY       CHOLECYSTECTOMY       COLOSTOMY      colon removal, colostomnyin [id number], reversed in      GI SURGERY  2002    splenectomy     HC EXCISION INFECTED MESH, ABDOMEN  2015     SPLENECTOMY  2002       Social History:  Social History     Tobacco Use     Smoking status: Former     Packs/day: 1.00     Types: Cigarettes     Quit date: 2020     Years since quittin.5     Smokeless tobacco: Never   Substance Use Topics     Alcohol use: Not Currently     Drug use: No     Comment: History of heroin (IV) and cocaine (smoked); sober since 2019       Family History:  Family History   Problem Relation Age of Onset     Lung Cancer Maternal Grandfather      Liver Disease No family hx of        Medications:  Current Outpatient Medications   Medication     albuterol (2.5 MG/3ML) 0.083% nebulizer solution     Albuterol Sulfate (PROVENTIL HFA) 108 (90 BASE) MCG/ACT AERS     aspirin 81 MG tablet     buprenorphine-naloxone (SUBOXONE) 8-2 MG SUBL sublingual tablet     hydrOXYzine (VISTARIL) 25 MG capsule     midodrine (PROAMATINE) 10 MG tablet     mirtazapine (REMERON) 15 MG tablet     OMEPRAZOLE PO     pregabalin (LYRICA) 75 MG capsule     promethazine (PHENERGAN) 25 MG tablet     sertraline (ZOLOFT) 50 MG tablet     Calcium Polycarbophil (FIBER-LAX PO)     carvedilol  "(COREG) 12.5 MG tablet     cloNIDine (CATAPRES) 0.1 MG tablet     diphenoxylate-atropine (LOMOTIL) 2.5-0.025 MG per tablet     DoxePIN (SINEQUAN) 50 MG capsule     Escitalopram Oxalate (LEXAPRO PO)     ferrous sulfate (FEROSUL) 325 (65 Fe) MG tablet     Gabapentin (NEURONTIN PO)     hydrOXYzine (ATARAX) 50 MG tablet     ibuprofen (ADVIL,MOTRIN) 400 MG tablet     Loratadine (CLARITIN PO)     METHADONE HCL PO     Prazosin HCl (MINIPRESS PO)     PROMETHAZINE HCL RE     vitamin C (ASCORBIC ACID) 500 MG tablet     No current facility-administered medications for this visit.       Review of Systems  A complete 10 point review of systems was asked and answered in the negative unless specifically commented upon in the HPI    Objective:         Vitals:    03/23/23 0816   BP: 127/83   Pulse: 79   Weight: 72.6 kg (160 lb)   Height: 1.676 m (5' 6\")     Body mass index is 25.82 kg/m .     Physical Exam  Constitutional: chronically ill, in no apparent distress.    HEENT: Normocephalic. no scleral icterus.   Neck/Lymph: Normal ROM  Respiratory:Normal respiratory excursion   GI:  Multiple surgical incisions on skin, diffuse scar tissue, non-tender  Skin:  No jaundice.   Peripheral Vascular: swelling of b/l LE edema  Musculoskeletal:  ROM intact, normal muscle bulk    Psychiatric: Normal mood and affect. Behavior is normal.  Neuro:  no asterixis, no tremor    Labs and Diagnostic tests:  Reviewed in [facility] Everywhere    Labs from 3/23/2023:  Na 141, Cr 1.35, Bili 0.2, INR 0.93  MELD-Na: 9    Imaging:  Abd US 11/15/2022  Findings:   Liver:  The liver measures 16.2 cm in craniocaudal dimension with coarsened echotexture and nodular surface contour. No focal hepatic lesions are appreciated.  The main portal vein is patent with normal flow directionality.   Gallbladder:  Surgically absent. The common duct measures 0.63 cm   Pancreas: Not seen.   Right kidney: Measures 10.7 x 4.5 x 4.9 cm.  Mild cortical thinning with increased " echogenicity. No focal lesion or hydronephrosis. No shadowing calculi.   Left kidney: Measures 8.5 x 3.8 x 3.6 cm.  Mild cortical thinning with increased echogenicity. No focal lesion or hydronephrosis. No shadowing calculi.   Spleen: Surgically absent   The aorta and the IVC are within normal limits.   Four-quadrant abdominal ascites.      ECHO 1/25/2023  Final Impressions:    1. Normal LV size, normal wall thickness, normal global systolic function with an estimated EF of 60 - 65%.    2. Normal diastolic function.    3. Right ventricular cavity size is normal, global systolic RV function is normal.    4. The aortic valve is normal and tricuspid, no stenosis and trivial regurgitation.    5. The mitral valve is normal, trace mitral regurgitation.    6. The inferior vena cava is dilated, respiratory size variation less than 50%.    7. Ascites and right pleural effusion noted.    8. PA systolic pressure could not be estimated (faint TR jet). PA pressure mildly elevated based on PA acceleration time.     CT Chest 1/18/2023  1. Extensive bilateral patchy ground-glass opacities. This could be infectious/inflammatory or perhaps due to edema. Recommend comparison prior outside chest CT from and dental care, not available for comparison at this time.   2. Trace bilateral pleural effusions.   3. Moderate to large ascites. Small pericardial effusion.   4. Numerous nonenlarged mediastinal, bilateral axillary and supraclavicular lymph nodes. Consider lymphoproliferative process. Comparison with prior CT chest would be helpful.   5. Centrilobular emphysema.        Procedures:  EGD: 3/19/2023  Findings:        Esophagogastric landmarks were identified: the Z-line was found at 35        cm, the upper extent of the gastric folds was found at 35 cm and the        site of hiatal narrowing was found at 37 cm from the incisors.        The esophagus was normal. No varices.        Mild portal hypertensive gastropathy was found in the  gastric fundus and        in the gastric body.        The exam of the stomach was otherwise normal. No gastric varices.        The examined duodenum was normal.       Colonoscopy: 2023  Findings:        There was an end-to-end anastomosis in the region of the descending        colon. There were scattered small, bland, non-bleeding ulcerations in        the region of the anastomosis, but the anastomosis itself was not        ulcerated or stenotic. The ulcers were biopsied. The remainder of the        colon was completely normal. The terminal ileum was entered for a        distance of approximately 15 cm and was without abnormality. No biopsies        taken from the SB.       Assessment and Plan:    Cirrhosis:    -Secondary to chronic hepatitis C infection and has been treated  -She has decompensated disease with ascites  - Current MELD 9    Chronic hepatitis C  -Treated with SVR in 2017; HCV RNA - 2020    Liver Transplant Candidacy:   - We discussed need for transplantation, organ availability and prioritization process for liver transplantation in the [place].  We discussed the guiding principals of justice and equality that dictate transplant candidacy  - We discussed the MELD score, the variables that are followed, and how the MELD score impacts transplantation  - We discussed the evaluation process for candidacy  - We discussed donor types - including  donors (DBD, DCD) and living donors  -She was evaluated for candidacy for liver transplantation in 2021.  At that time she was not determined to have an overt need for liver transplantation.  In that discussion though, there was significant concerns about her history of abdominal surgeries as well as medical conditions.  Unfortunately since that time, she has had several other medical conditions develop with severe morbidity (DVT requiring anticoagulation, recurrent severe GI blood loss, severe left lower extremity peripheral  vascular disease with chronic wounds) as well as another abdominal surgery to treat a chronic unhealing wound.    -We did discuss her medical history, surgical history, social history, and current clinical status with regards to her liver disease with members of the [facility] liver transplant team, and based on myriad medical and surgical conditions, many of which are severe in their own nature independent of her liver disease, she is unfortunately not a candidate for liver transplantation at our center at this time    Hepatocellular Cancer Screening:   -Her last abdominal ultrasound for screening for HCC was performed in November 2022 and she will be due again in May of this year  - Recommend screening for HCC every 6 months with either abdominal ultrasound or by alternating abdominal ultrasound with EITHER a triple/quad phase CT Liver with IV contrast OR a Quad phase MRI Liver with IV contrast.  AFP levels should be checked every 6 months at time of imaging screen.    Ascites:  -Unfortunately since she was last seen in 2021, she has developed regression of her ascites, now requiring a paracentesis approximately once per week.  She does note some morbidity and a significant decrease in quality of life related to the development of ascites  -When looking at parameters for liver function, noted that her bilirubin and INR are well within normal limits.  Also noted that she does not have esophageal varices on multiple recent endoscopies: All of which suggest against significant decompensated liver disease is the main  of her ascites  -I do have concerns that her chronic kidney disease is contributing to the presence and persistence of her ascites  -I would also have concerns that her multiple abdominal surgeries have impacted lymphatic drainage of her abdominal cavity and this too is exacerbating her ascites  -Based on her history of IgA nephropathy and CKD stage III, I do not believe that pushing  diuretic therapy at this time provides benefits that outweigh risks spoke with senior partners  -In our hepatology group, and based on her current MELD and synthetic function, felt the most prudent approach would be to have interventional radiology perform transjugular portal pressure measurements and if HVPG is greater than 8 mmHg, would attempt TIPS placement.  If less than 8 mmHg, this would suggest that her liver disease is not the main  of her ascites and that there would be little benefit to TIPS    -I did overtly discussed with the patient and her sister that I felt that there was a very real likelihood that her ascites would not resolve with TIPS as I do believe that it is multifactorial  - Continue to follow a sodium restricted (<2g sodium diet)     Hepatic Encephalopathy:  -No acute issues at this time.  Educated as to the symptoms    Esophageal Varices:   -EGD performed 3/2023 without evidence of esophageal varices    Kidney Health:   - Has CKD 3 and very prone to HALLIE    Recurrent GI bleeding:  - Based on recent history and review of recent endoscopies, it appears that she is likely bleeding from areas of ischemia around her colonic anastomosis.  This likely exacerbated by use of anticoagulation  - Will defer to her primary GI team as they may wish to consider CT angio to look for vascular disease amenable to intervention    Nutrition:  As with most patients with chronic liver disease, there is a significant degree of protein malnutrition.  Dicussed need to change dietary habits to improve overall protein balance.  Discussed the importance of eating between 1.2-1.5g/kg/day lean protein like eggs, fish, chicken, nuts, and legumes, in addition to a diet rich in fresh fruits and vegetables.  Continue to follow a sodium restricted (<2g sodium diet) and discussed the need to minimize the intake of carbohydrates and sugars, to avoid obesity.   - Strongly encourage protein supplements 2-3 times daily  (Boost, Ensure, [place] Instant Milk, etc.) to meet protein and caloric intake.  - Recommend a bedtime snack with protein and complex carbohydrate to minimize risk of muscle catabolism overnight    Routine Health Care in Patient with Chronic Liver Disease:  - We recommend screening for hepatitis A and B, please vaccinate if not immune  - All patients with liver disease, particularly those with cholestatic liver disease, are at an increased risk for osteoporosis.  We strongly recommend screening for Vitamin D deficiency at least twice yearly with aggressive supplementation/replacement as indicated.    - We also recommend a screening DEXA scan to evaluate for osteoporosis.  If present, should treat with calcium, Vitamin D supplementation, and recommend consideration of bisphosphonate therapy.  Also recommend follow up DEXA scans to evaluate for improvement of bone density on therapy.  - All patients with liver disease should avoid the use of Non-steroidal Anti-Inflammatory (NSAID) medications as they can cause significant injury to the kidneys in this population    Follow Up:   WALLY     Thank you very much for the opportunity to participate in the care of this patient.  If you have any further questions, please don't hesitate to contact our office.    Thomas M. Leventhal, M.D.   of Medicine  Advanced & Transplant Hepatology  [facility]     Approximately 90 minutes of non face-to-face time were spent in review of the patient's medical record on 3/22/2023  This included review of previous: clinic visits, hospital records, lab results, imaging studies, and procedural documentation.  The findings from this review are summarized in the above note.

## 2023-03-23 NOTE — PROGRESS NOTES
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    She was seen in clinic initially in April 2021 for transplant evaluation.  She was discussed at the [facility] as liver transplant selection committee and was felt that based on her low MELD score that she did not overtly need liver transplantation at that time, and therefore was not placed on the transplant wait list.     Since last seen she has had significant changes in her overall health status.  She did undergo some significant social stressors in addition to her chronic medical stressors and did have relapse use of inhaled substances in early 2022.  She does remain on Suboxone, has reestablish care with a substance use counselor and continues with a sponsor and a recovery program.  She was admitted in December with concerns of significant volume overload and acute kidney injury.  She was then admitted again from January 15 through February 15 with concerns of left lower extremity ischemia.  She had an extensive evaluation which demonstrated reduced MERY in her left lower extremity, and impaired tissue oxygen diffusion.  She had lower extremity ulcers noted.  Unfortunate during the hospitalization she also developed a DVT in the lower extremity.  She  was started on anticoagulation, but unfortunately has had ongoing issues with overt obscure GI bleeding.  She is undergone multiple colonoscopies and endoscopies: The endoscopies did not demonstrate any overt evidence of varices and demonstrate mild portal hypertensive gastropathy.  The colonoscopy is continued to demonstrate areas of inflammation around the colonic anastomoses, that have required hemostatic clip placement in the past.  No other overt source of GI blood loss has been noted.  She was again admitted in late February with concerns of presyncope.  Its important note that during her hospitalization in January she was having issues with persistent hypotension.  Her carvedilol was discontinued and she was started on midodrine.  Her most recent admission was March 17 through March 20 again with GI bleed of unclear source.    Since that hospitalization she has made the decision to hold her anticoagulation given her perception of risk versus benefits.    She notes she has intermittent episodes of feeling dizziness or fatigue, but not sure if she is experiencing overt changes in mental status.  She does have ongoing issues with volume overload: Diuretics have been managed primarily by nephrology.  She does require paracentesis with approximately 5 to 6 L removed every week, and has been in this routine for almost the past 2 years.    History of Liver Disease:  Diagnosis chronic hepatitis C in 2014.  Underwent treatment in 2017 with 12 weeks of Harvoni and obtained SVR.  Labs checked last in October 2020 with negative hep C viral level.  She had decompensated disease prior to her treatment with ascites, and has had less than a half dozen paracentesis in her lifetime.  Her last paracentesis was in December during her hospitalization, and she was started on Lasix and spironolactone at that time and has not had issues with further volume overload.  She has not had issues with impaired memory or concentration.  An  EGD performed in December 2020 did not demonstrate any evidence of esophageal varices.  Abdominal imaging was performed in December 2020 and did not demonstrate any evidence of hepatocellular carcinoma.    She had recurrent issues with C. difficile in the past and unfortunately developed toxic megacolon requiring an almost total colectomy in 2008.  She had a colostomy for approximately 1 year and then had a takedown.  She is also had abdominal surgeries including: An appendectomy in 2004, cholecystectomy, and a splenectomy for ITP back in 2002.  She had significant mesh use for abdominal hernias and this was revised in 2015/2016 -unfortunately she has had issues with a chronic nonhealing abdominal wound since this time.  She packs the wound and has a topical dressing that she uses and changes frequently.  She was followed by the wound clinic at [facility] for the several past years.    She notes she does carry a history of COPD and does have a historical diagnosis of heart failure.  The diagnosis of heart failure was given to her when she was living in [place], and notes at that time that she had developed intermittent V. tach which was felt related to her chronic methadone use.  Methadone was transitioned to Suboxone and she was started on carvedilol and she does not believe she has any issues with heart failure at this time.    Past Medical History:  Past Medical History:   Diagnosis Date     Acute idiopathic thrombocytopenic purpura (H)     s/p splenectomy 2002     Anxiety      Anxiety and depression      Ascites      CKD (chronic kidney disease) stage 3, GFR 30-59 ml/min (H)      COPD (chronic obstructive pulmonary disease) (H)      Depression      Hepatic cirrhosis due to chronic hepatitis C infection (H)      History of blood transfusion      History of cocaine abuse (H)      History of heart failure      History of hepatitis C Dx 2014    SVR after 12 weeks Alonrda 2017     Hypertension      Nicotine dependence       Opioid dependence on maintenance agonist therapy, no symptoms (H)     Was on methadone (developed V Tach), now on suboxone     Recurrent colitis due to Clostridioides difficile     complicated by megacolon s/p colectomy      Seizure disorder (H)      Seizures (H)        Surgical History:  Past Surgical History:   Procedure Laterality Date     APPENDECTOMY  2004     CHOLECYSTECTOMY  2013     COLOSTOMY      colon removal, colostomnyin [id number], reversed in      GI SURGERY      splenectomy     HC EXCISION INFECTED MESH, ABDOMEN  2015     SPLENECTOMY         Social History:  Social History     Tobacco Use     Smoking status: Former     Packs/day: 1.00     Types: Cigarettes     Quit date: 2020     Years since quittin.5     Smokeless tobacco: Never   Substance Use Topics     Alcohol use: Not Currently     Drug use: No     Comment: History of heroin (IV) and cocaine (smoked); sober since        Family History:  Family History   Problem Relation Age of Onset     Lung Cancer Maternal Grandfather      Liver Disease No family hx of        Medications:  Current Outpatient Medications   Medication     albuterol (2.5 MG/3ML) 0.083% nebulizer solution     Albuterol Sulfate (PROVENTIL HFA) 108 (90 BASE) MCG/ACT AERS     aspirin 81 MG tablet     buprenorphine-naloxone (SUBOXONE) 8-2 MG SUBL sublingual tablet     hydrOXYzine (VISTARIL) 25 MG capsule     midodrine (PROAMATINE) 10 MG tablet     mirtazapine (REMERON) 15 MG tablet     OMEPRAZOLE PO     pregabalin (LYRICA) 75 MG capsule     promethazine (PHENERGAN) 25 MG tablet     sertraline (ZOLOFT) 50 MG tablet     Calcium Polycarbophil (FIBER-LAX PO)     carvedilol (COREG) 12.5 MG tablet     cloNIDine (CATAPRES) 0.1 MG tablet     diphenoxylate-atropine (LOMOTIL) 2.5-0.025 MG per tablet     DoxePIN (SINEQUAN) 50 MG capsule     Escitalopram Oxalate (LEXAPRO PO)     ferrous sulfate (FEROSUL) 325 (65 Fe) MG tablet     Gabapentin (NEURONTIN PO)      "hydrOXYzine (ATARAX) 50 MG tablet     ibuprofen (ADVIL,MOTRIN) 400 MG tablet     Loratadine (CLARITIN PO)     METHADONE HCL PO     Prazosin HCl (MINIPRESS PO)     PROMETHAZINE HCL RE     vitamin C (ASCORBIC ACID) 500 MG tablet     No current facility-administered medications for this visit.       Review of Systems  A complete 10 point review of systems was asked and answered in the negative unless specifically commented upon in the HPI    Objective:         Vitals:    03/23/23 0816   BP: 127/83   Pulse: 79   Weight: 72.6 kg (160 lb)   Height: 1.676 m (5' 6\")     Body mass index is 25.82 kg/m .     Physical Exam  Constitutional: chronically ill, in no apparent distress.    HEENT: Normocephalic. no scleral icterus.   Neck/Lymph: Normal ROM  Respiratory:Normal respiratory excursion   GI:  Multiple surgical incisions on skin, diffuse scar tissue, non-tender  Skin:  No jaundice.   Peripheral Vascular: swelling of b/l LE edema  Musculoskeletal:  ROM intact, normal muscle bulk    Psychiatric: Normal mood and affect. Behavior is normal.  Neuro:  no asterixis, no tremor    Labs and Diagnostic tests:  Reviewed in [facility] Everywhere    Labs from 3/23/2023:  Na 141, Cr 1.35, Bili 0.2, INR 0.93  MELD-Na: 9    Imaging:  Abd US 11/15/2022  Findings:   Liver:  The liver measures 16.2 cm in craniocaudal dimension with coarsened echotexture and nodular surface contour. No focal hepatic lesions are appreciated.  The main portal vein is patent with normal flow directionality.   Gallbladder:  Surgically absent. The common duct measures 0.63 cm   Pancreas: Not seen.   Right kidney: Measures 10.7 x 4.5 x 4.9 cm.  Mild cortical thinning with increased echogenicity. No focal lesion or hydronephrosis. No shadowing calculi.   Left kidney: Measures 8.5 x 3.8 x 3.6 cm.  Mild cortical thinning with increased echogenicity. No focal lesion or hydronephrosis. No shadowing calculi.   Spleen: Surgically absent   The aorta and the IVC are within " normal limits.   Four-quadrant abdominal ascites.      ECHO 1/25/2023  Final Impressions:    1. Normal LV size, normal wall thickness, normal global systolic function with an estimated EF of 60 - 65%.    2. Normal diastolic function.    3. Right ventricular cavity size is normal, global systolic RV function is normal.    4. The aortic valve is normal and tricuspid, no stenosis and trivial regurgitation.    5. The mitral valve is normal, trace mitral regurgitation.    6. The inferior vena cava is dilated, respiratory size variation less than 50%.    7. Ascites and right pleural effusion noted.    8. PA systolic pressure could not be estimated (faint TR jet). PA pressure mildly elevated based on PA acceleration time.     CT Chest 1/18/2023  1. Extensive bilateral patchy ground-glass opacities. This could be infectious/inflammatory or perhaps due to edema. Recommend comparison prior outside chest CT from and dental care, not available for comparison at this time.   2. Trace bilateral pleural effusions.   3. Moderate to large ascites. Small pericardial effusion.   4. Numerous nonenlarged mediastinal, bilateral axillary and supraclavicular lymph nodes. Consider lymphoproliferative process. Comparison with prior CT chest would be helpful.   5. Centrilobular emphysema.        Procedures:  EGD: 3/19/2023  Findings:        Esophagogastric landmarks were identified: the Z-line was found at 35        cm, the upper extent of the gastric folds was found at 35 cm and the        site of hiatal narrowing was found at 37 cm from the incisors.        The esophagus was normal. No varices.        Mild portal hypertensive gastropathy was found in the gastric fundus and        in the gastric body.        The exam of the stomach was otherwise normal. No gastric varices.        The examined duodenum was normal.       Colonoscopy: 2/6/2023  Findings:        There was an end-to-end anastomosis in the region of the descending        colon.  There were scattered small, bland, non-bleeding ulcerations in        the region of the anastomosis, but the anastomosis itself was not        ulcerated or stenotic. The ulcers were biopsied. The remainder of the        colon was completely normal. The terminal ileum was entered for a        distance of approximately 15 cm and was without abnormality. No biopsies        taken from the SB.       Assessment and Plan:    Cirrhosis:    -Secondary to chronic hepatitis C infection and has been treated  -She has decompensated disease with ascites  - Current MELD 9    Chronic hepatitis C  -Treated with SVR in ; HCV RNA - 2020    Liver Transplant Candidacy:   - We discussed need for transplantation, organ availability and prioritization process for liver transplantation in the [place].  We discussed the guiding principals of justice and equality that dictate transplant candidacy  - We discussed the MELD score, the variables that are followed, and how the MELD score impacts transplantation  - We discussed the evaluation process for candidacy  - We discussed donor types - including  donors (DBD, DCD) and living donors  -She was evaluated for candidacy for liver transplantation in 2021.  At that time she was not determined to have an overt need for liver transplantation.  In that discussion though, there was significant concerns about her history of abdominal surgeries as well as medical conditions.  Unfortunately since that time, she has had several other medical conditions develop with severe morbidity (DVT requiring anticoagulation, recurrent severe GI blood loss, severe left lower extremity peripheral vascular disease with chronic wounds) as well as another abdominal surgery to treat a chronic unhealing wound.    -We did discuss her medical history, surgical history, social history, and current clinical status with regards to her liver disease with members of the [facility] liver  transplant team, and based on myriad medical and surgical conditions, many of which are severe in their own nature independent of her liver disease, she is unfortunately not a candidate for liver transplantation at our center at this time    Hepatocellular Cancer Screening:   -Her last abdominal ultrasound for screening for HCC was performed in November 2022 and she will be due again in May of this year  - Recommend screening for HCC every 6 months with either abdominal ultrasound or by alternating abdominal ultrasound with EITHER a triple/quad phase CT Liver with IV contrast OR a Quad phase MRI Liver with IV contrast.  AFP levels should be checked every 6 months at time of imaging screen.    Ascites:  -Unfortunately since she was last seen in 2021, she has developed regression of her ascites, now requiring a paracentesis approximately once per week.  She does note some morbidity and a significant decrease in quality of life related to the development of ascites  -When looking at parameters for liver function, noted that her bilirubin and INR are well within normal limits.  Also noted that she does not have esophageal varices on multiple recent endoscopies: All of which suggest against significant decompensated liver disease is the main  of her ascites  -I do have concerns that her chronic kidney disease is contributing to the presence and persistence of her ascites  -I would also have concerns that her multiple abdominal surgeries have impacted lymphatic drainage of her abdominal cavity and this too is exacerbating her ascites  -Based on her history of IgA nephropathy and CKD stage III, I do not believe that pushing diuretic therapy at this time provides benefits that outweigh risks spoke with senior partners  -In our hepatology group, and based on her current MELD and synthetic function, felt the most prudent approach would be to have interventional radiology perform transjugular portal pressure measurements  and if HVPG is greater than 8 mmHg, would attempt TIPS placement.  If less than 8 mmHg, this would suggest that her liver disease is not the main  of her ascites and that there would be little benefit to TIPS    -I did overtly discussed with the patient and her sister that I felt that there was a very real likelihood that her ascites would not resolve with TIPS as I do believe that it is multifactorial  - Continue to follow a sodium restricted (<2g sodium diet)     Hepatic Encephalopathy:  -No acute issues at this time.  Educated as to the symptoms    Esophageal Varices:   -EGD performed 3/2023 without evidence of esophageal varices    Kidney Health:   - Has CKD 3 and very prone to HALLIE    Recurrent GI bleeding:  - Based on recent history and review of recent endoscopies, it appears that she is likely bleeding from areas of ischemia around her colonic anastomosis.  This likely exacerbated by use of anticoagulation  - Will defer to her primary GI team as they may wish to consider CT angio to look for vascular disease amenable to intervention    Nutrition:  As with most patients with chronic liver disease, there is a significant degree of protein malnutrition.  Dicussed need to change dietary habits to improve overall protein balance.  Discussed the importance of eating between 1.2-1.5g/kg/day lean protein like eggs, fish, chicken, nuts, and legumes, in addition to a diet rich in fresh fruits and vegetables.  Continue to follow a sodium restricted (<2g sodium diet) and discussed the need to minimize the intake of carbohydrates and sugars, to avoid obesity.   - Strongly encourage protein supplements 2-3 times daily (Boost, Ensure, Josephine Instant Milk, etc.) to meet protein and caloric intake.  - Recommend a bedtime snack with protein and complex carbohydrate to minimize risk of muscle catabolism overnight    Routine Health Care in Patient with Chronic Liver Disease:  - We recommend screening for hepatitis A and  B, please vaccinate if not immune  - All patients with liver disease, particularly those with cholestatic liver disease, are at an increased risk for osteoporosis.  We strongly recommend screening for Vitamin D deficiency at least twice yearly with aggressive supplementation/replacement as indicated.    - We also recommend a screening DEXA scan to evaluate for osteoporosis.  If present, should treat with calcium, Vitamin D supplementation, and recommend consideration of bisphosphonate therapy.  Also recommend follow up DEXA scans to evaluate for improvement of bone density on therapy.  - All patients with liver disease should avoid the use of Non-steroidal Anti-Inflammatory (NSAID) medications as they can cause significant injury to the kidneys in this population    Follow Up:   TBD     Thank you very much for the opportunity to participate in the care of this patient.  If you have any further questions, please don't hesitate to contact our office.    Thomas M. Leventhal, M.D.   of Medicine  Advanced & Transplant Hepatology  [facility]     Approximately 90 minutes of non face-to-face time were spent in review of the patient's medical record on 3/22/2023  This included review of previous: clinic visits, hospital records, lab results, imaging studies, and procedural documentation.  The findings from this review are summarized in the above note.